# Patient Record
Sex: FEMALE | Race: WHITE | HISPANIC OR LATINO | Employment: FULL TIME
[De-identification: names, ages, dates, MRNs, and addresses within clinical notes are randomized per-mention and may not be internally consistent; named-entity substitution may affect disease eponyms.]

---

## 2018-10-04 ENCOUNTER — HOSPITAL (OUTPATIENT)
Dept: OTHER | Age: 33
End: 2018-10-04
Attending: ORTHOPAEDIC SURGERY

## 2018-11-01 ENCOUNTER — HOSPITAL (OUTPATIENT)
Dept: OTHER | Age: 33
End: 2018-11-01
Attending: ORTHOPAEDIC SURGERY

## 2019-07-17 ENCOUNTER — OFFICE VISIT (OUTPATIENT)
Dept: OBGYN CLINIC | Facility: CLINIC | Age: 34
End: 2019-07-17
Payer: COMMERCIAL

## 2019-07-17 VITALS
SYSTOLIC BLOOD PRESSURE: 109 MMHG | HEART RATE: 74 BPM | DIASTOLIC BLOOD PRESSURE: 75 MMHG | BODY MASS INDEX: 36 KG/M2 | WEIGHT: 191 LBS

## 2019-07-17 DIAGNOSIS — N97.9 INFERTILITY, FEMALE: ICD-10-CM

## 2019-07-17 DIAGNOSIS — Z11.3 ROUTINE SCREENING FOR STI (SEXUALLY TRANSMITTED INFECTION): ICD-10-CM

## 2019-07-17 DIAGNOSIS — Z01.419 ENCOUNTER FOR WELL WOMAN EXAM WITH ROUTINE GYNECOLOGICAL EXAM: Primary | ICD-10-CM

## 2019-07-17 LAB
CYTOLOGY CVX/VAG DOC THIN PREP: NORMAL
HPV16+18+45 E6+E7MRNA CVX NAA+PROBE: NEGATIVE

## 2019-07-17 PROCEDURE — 99385 PREV VISIT NEW AGE 18-39: CPT | Performed by: OBSTETRICS & GYNECOLOGY

## 2019-07-18 LAB
C TRACH DNA SPEC QL NAA+PROBE: NEGATIVE
HPV I/H RISK 1 DNA SPEC QL NAA+PROBE: NEGATIVE
N GONORRHOEA DNA SPEC QL NAA+PROBE: NEGATIVE

## 2019-07-20 NOTE — PROGRESS NOTES
Annual Gyn Exam    HPI Nabil Valentine is a known patient of mine who I had seen a number of years ago at [facility] and is here for an annual gynecologic evaluation and Pap smear.   She is currently seeing reproductive endocrinology for infertility and t shortness of breath and wheezing. Cardiovascular: Negative for palpitations. Gastrointestinal: Negative for abdominal pain, blood in stool, constipation, diarrhea, nausea and vomiting.    Genitourinary: Negative for dyspareunia, dysuria, frequency, hem gynecologic evaluation  Exam, pap with HPV and cervical gonorrhea and Chlamydia cultures done  Discussed flu vaccine recommendations. - THINPREP PAP SMEAR B; Future  - HPV HIGH RISK , THIN PREP COLLECTION;  Future  - THINPREP PAP SMEAR B  - HPV HIGH RISK

## 2019-07-26 ENCOUNTER — TELEPHONE (OUTPATIENT)
Dept: OBGYN CLINIC | Facility: CLINIC | Age: 34
End: 2019-07-26

## 2019-08-20 NOTE — TELEPHONE ENCOUNTER
Labs and pap sent to provider per pt request.   Confirmation page received. Lm informing pt that labs were faxed and pt to rc if has further questions.

## 2020-01-20 ENCOUNTER — TELEPHONE (OUTPATIENT)
Dept: OBGYN CLINIC | Facility: CLINIC | Age: 35
End: 2020-01-20

## 2020-01-20 ENCOUNTER — OFFICE VISIT (OUTPATIENT)
Dept: OBGYN CLINIC | Facility: CLINIC | Age: 35
End: 2020-01-20
Payer: COMMERCIAL

## 2020-01-20 VITALS
BODY MASS INDEX: 36 KG/M2 | SYSTOLIC BLOOD PRESSURE: 121 MMHG | WEIGHT: 189.19 LBS | DIASTOLIC BLOOD PRESSURE: 83 MMHG | HEART RATE: 73 BPM

## 2020-01-20 DIAGNOSIS — N83.201 CYST OF OVARY, RIGHT: Primary | ICD-10-CM

## 2020-01-20 DIAGNOSIS — N83.201 CYST OF RIGHT OVARY: Primary | ICD-10-CM

## 2020-01-20 PROCEDURE — 99213 OFFICE O/P EST LOW 20 MIN: CPT | Performed by: OBSTETRICS & GYNECOLOGY

## 2020-01-20 NOTE — PROGRESS NOTES
[facility], [facility]  Obstetrics and Gynecology  Focused Gynecology Problem Exam  Derek Chan MD    Shamika Villalpando is a 29year old female presenting for Consult (er pt has 2 endemetrionas on right ovary )  .     HPI:   Patient presents with:  Consult: er Transportation needs:        Medical: Not on file        Non-medical: Not on file    Tobacco Use      Smoking status: Never Smoker      Smokeless tobacco: Never Used    Substance and Sexual Activity      Alcohol use: Never        Frequency: Never      Elvin No prescriptions requested or ordered in this encounter     IMAGING/ REFERRALS:    None     Kristal Leonard MD  1/20/2020  1:52 PM

## 2020-01-20 NOTE — TELEPHONE ENCOUNTER
Please schedule the following surgery:    Procedure: daVinci assisted right ovarian cystectomy, possible laparotomy  Assist: (Y/N or none) yes  Date: ASAP  Dx: (N83.201) Cyst of right ovary  (primary encounter diagnosis)  Pre-op appt: (Y/N or n/a) Yes  Adm

## 2020-01-22 NOTE — TELEPHONE ENCOUNTER
Pt scheduled 2/6 in the Xi room at 7:30. Informed of sx details . she Understood and verbalized agreement. Pre-op: 1/29 at [email or web address] (pt informed of location). Pt inquires on recovery time to inform her employer. She also asked about repeating an U/S?  Please

## 2020-01-22 NOTE — TELEPHONE ENCOUNTER
Please provide patient with the number for central scheduling. Her pelvic US order is in the computer. She needs a pre-op visit and should have the pelvic US before her visit.

## 2020-01-23 NOTE — TELEPHONE ENCOUNTER
Per pt scheduled u/s on 29th, no sooner apts available.  Changed pre op apt with TIFF on 1/31 per pt request.

## 2020-01-23 NOTE — TELEPHONE ENCOUNTER
Pt informed of u/s order in system now. provided her with number to central scheduling. She Understood and verbalized agreement.

## 2020-01-28 RX ORDER — CHOLECALCIFEROL (VITAMIN D3) 50 MCG
TABLET ORAL WEEKLY
COMMUNITY
End: 2020-08-18

## 2020-01-28 RX ORDER — NORGESTIMATE AND ETHINYL ESTRADIOL 0.25-0.035
1 KIT ORAL DAILY
COMMUNITY
End: 2020-08-18

## 2020-01-29 ENCOUNTER — HOSPITAL ENCOUNTER (OUTPATIENT)
Dept: ULTRASOUND IMAGING | Age: 35
Discharge: HOME | End: 2020-01-29
Attending: OBSTETRICS & GYNECOLOGY
Payer: COMMERCIAL

## 2020-01-29 DIAGNOSIS — N83.201 CYST OF OVARY, RIGHT: ICD-10-CM

## 2020-01-29 PROCEDURE — 76830 TRANSVAGINAL US NON-OB: CPT | Performed by: OBSTETRICS & GYNECOLOGY

## 2020-01-29 PROCEDURE — 76856 US EXAM PELVIC COMPLETE: CPT | Performed by: OBSTETRICS & GYNECOLOGY

## 2020-01-31 ENCOUNTER — TELEPHONE (OUTPATIENT)
Dept: OBGYN CLINIC | Facility: CLINIC | Age: 35
End: 2020-01-31

## 2020-01-31 ENCOUNTER — OFFICE VISIT (OUTPATIENT)
Dept: OBGYN CLINIC | Facility: CLINIC | Age: 35
End: 2020-01-31
Payer: COMMERCIAL

## 2020-01-31 ENCOUNTER — TELEPHONE (OUTPATIENT)
Dept: INTERNAL MEDICINE CLINIC | Facility: CLINIC | Age: 35
End: 2020-01-31

## 2020-01-31 VITALS — WEIGHT: 190 LBS | SYSTOLIC BLOOD PRESSURE: 122 MMHG | BODY MASS INDEX: 36 KG/M2 | DIASTOLIC BLOOD PRESSURE: 70 MMHG

## 2020-01-31 DIAGNOSIS — Z01.818 PRE-OP TESTING: ICD-10-CM

## 2020-01-31 DIAGNOSIS — N97.9 INFERTILITY, FEMALE: ICD-10-CM

## 2020-01-31 DIAGNOSIS — N83.201 CYST OF RIGHT OVARY: Primary | ICD-10-CM

## 2020-01-31 DIAGNOSIS — R10.2 PELVIC PAIN: ICD-10-CM

## 2020-01-31 PROCEDURE — 99213 OFFICE O/P EST LOW 20 MIN: CPT | Performed by: OBSTETRICS & GYNECOLOGY

## 2020-01-31 NOTE — TELEPHONE ENCOUNTER
Signed HIPAA/FCR - [facility] disab form logged for processing.   in [employer] [facility] when completed

## 2020-01-31 NOTE — TELEPHONE ENCOUNTER
Pt filled out NICOLE, Forms Completion Request form, and left FMLA forms at [facility] location to be filled out by Dr. Chanelle Field. Email sent to forms dept, [employer] interofficed to [facility]. Fee will be paid upon pickup at [facility].

## 2020-02-02 NOTE — PROGRESS NOTES
[facility]  Obstetrics and Gynecology  Focused Gynecology Problem Exam  Elio Duncan MD    Gabriela Julien is a 29year old female presenting for Gyn Exam (pre op)  .     HPI:   Patient presents with:  Gyn Exam: pre op    Pt with persistent right Cancer Other        Social History    Socioeconomic History      Marital status:       Spouse name: Not on file      Number of children: Not on file      Years of education: Not on file      Highest education level: Not on file    Occupational Histo hemorrhagic cyst.  Additional smaller slightly complicated right ovarian cyst measuring 2.1 x 1.1 x 2.0 cm   with fine internal echoes. These both may be hemorrhagic cysts. LEFT:    Left ovary measures 1.7 x 2.0 x 1.8 cm. Small follicles.      CUL-DE-SAC well as cystectomy. Discussed risks of procedure including infection, bleeding, transfusion and damage to other organs including bowel, bladder, blood vessels, ovary etc.  Discussed possible laparotomy. Pt understands and has consented to the procedure.

## 2020-02-03 ENCOUNTER — LAB ENCOUNTER (OUTPATIENT)
Dept: LAB | Age: 35
End: 2020-02-03
Attending: OBSTETRICS & GYNECOLOGY
Payer: COMMERCIAL

## 2020-02-03 DIAGNOSIS — Z01.818 PRE-OP TESTING: ICD-10-CM

## 2020-02-03 LAB
ANTIBODY SCREEN: NEGATIVE
BASOPHILS # BLD AUTO: 0.05 X10(3) UL (ref 0–0.2)
BASOPHILS NFR BLD AUTO: 0.5 %
DEPRECATED RDW RBC AUTO: 42.2 FL (ref 35.1–46.3)
EOSINOPHIL # BLD AUTO: 0.51 X10(3) UL (ref 0–0.7)
EOSINOPHIL NFR BLD AUTO: 5.4 %
ERYTHROCYTE [DISTWIDTH] IN BLOOD BY AUTOMATED COUNT: 12.9 % (ref 11–15)
HCT VFR BLD AUTO: 37.3 % (ref 35–48)
HGB BLD-MCNC: 12 G/DL (ref 12–16)
IMM GRANULOCYTES # BLD AUTO: 0.02 X10(3) UL (ref 0–1)
IMM GRANULOCYTES NFR BLD: 0.2 %
LYMPHOCYTES # BLD AUTO: 1.78 X10(3) UL (ref 1–4)
LYMPHOCYTES NFR BLD AUTO: 18.8 %
MCH RBC QN AUTO: 29 PG (ref 26–34)
MCHC RBC AUTO-ENTMCNC: 32.2 G/DL (ref 31–37)
MCV RBC AUTO: 90.1 FL (ref 80–100)
MONOCYTES # BLD AUTO: 0.31 X10(3) UL (ref 0.1–1)
MONOCYTES NFR BLD AUTO: 3.3 %
NEUTROPHILS # BLD AUTO: 6.81 X10 (3) UL (ref 1.5–7.7)
NEUTROPHILS # BLD AUTO: 6.81 X10(3) UL (ref 1.5–7.7)
NEUTROPHILS NFR BLD AUTO: 71.8 %
PLATELET # BLD AUTO: 204 10(3)UL (ref 150–450)
RBC # BLD AUTO: 4.14 X10(6)UL (ref 3.8–5.3)
RH BLOOD TYPE: POSITIVE
WBC # BLD AUTO: 9.5 X10(3) UL (ref 4–11)

## 2020-02-03 PROCEDURE — 36415 COLL VENOUS BLD VENIPUNCTURE: CPT

## 2020-02-03 PROCEDURE — 86900 BLOOD TYPING SEROLOGIC ABO: CPT

## 2020-02-03 PROCEDURE — 86901 BLOOD TYPING SEROLOGIC RH(D): CPT

## 2020-02-03 PROCEDURE — 85025 COMPLETE CBC W/AUTO DIFF WBC: CPT

## 2020-02-03 PROCEDURE — 86850 RBC ANTIBODY SCREEN: CPT

## 2020-02-04 ENCOUNTER — TELEPHONE (OUTPATIENT)
Dept: OBGYN CLINIC | Facility: CLINIC | Age: 35
End: 2020-02-04

## 2020-02-04 NOTE — TELEPHONE ENCOUNTER
Pt requesting note that she is having surgery on Thursday and that she will be off for 2 weeks. Please advise.

## 2020-02-04 NOTE — TELEPHONE ENCOUNTER
Please provide patient with a note stating that she is having surgery 2/6/2020 and will be off work for 2 weeks.

## 2020-02-06 ENCOUNTER — HOSPITAL ENCOUNTER (OUTPATIENT)
Facility: HOSPITAL | Age: 35
Setting detail: HOSPITAL OUTPATIENT SURGERY
Discharge: HOME | End: 2020-02-06
Attending: OBSTETRICS & GYNECOLOGY | Admitting: OBSTETRICS & GYNECOLOGY
Payer: COMMERCIAL

## 2020-02-06 ENCOUNTER — ANESTHESIA EVENT (OUTPATIENT)
Dept: SURGERY | Facility: HOSPITAL | Age: 35
End: 2020-02-06
Payer: COMMERCIAL

## 2020-02-06 ENCOUNTER — ANESTHESIA (OUTPATIENT)
Dept: SURGERY | Facility: HOSPITAL | Age: 35
End: 2020-02-06
Payer: COMMERCIAL

## 2020-02-06 VITALS
BODY MASS INDEX: 37.11 KG/M2 | WEIGHT: 189 LBS | RESPIRATION RATE: 18 BRPM | SYSTOLIC BLOOD PRESSURE: 126 MMHG | TEMPERATURE: 98 F | HEIGHT: 60 IN | OXYGEN SATURATION: 98 % | DIASTOLIC BLOOD PRESSURE: 66 MMHG | HEART RATE: 91 BPM

## 2020-02-06 DIAGNOSIS — N83.201 CYST OF OVARY, RIGHT: ICD-10-CM

## 2020-02-06 LAB — B-HCG UR QL: NEGATIVE

## 2020-02-06 PROCEDURE — 58662 LAPAROSCOPY EXCISE LESIONS: CPT | Performed by: OBSTETRICS & GYNECOLOGY

## 2020-02-06 PROCEDURE — 8E0W4CZ ROBOTIC ASSISTED PROCEDURE OF TRUNK REGION, PERCUTANEOUS ENDOSCOPIC APPROACH: ICD-10-PCS | Performed by: OBSTETRICS & GYNECOLOGY

## 2020-02-06 PROCEDURE — 0UB04ZZ EXCISION OF RIGHT OVARY, PERCUTANEOUS ENDOSCOPIC APPROACH: ICD-10-PCS | Performed by: OBSTETRICS & GYNECOLOGY

## 2020-02-06 RX ORDER — MORPHINE SULFATE 4 MG/ML
4 INJECTION, SOLUTION INTRAMUSCULAR; INTRAVENOUS EVERY 10 MIN PRN
Status: DISCONTINUED | OUTPATIENT
Start: 2020-02-06 | End: 2020-02-06

## 2020-02-06 RX ORDER — HYDROCODONE BITARTRATE AND ACETAMINOPHEN 5; 325 MG/1; MG/1
2 TABLET ORAL AS NEEDED
Status: DISCONTINUED | OUTPATIENT
Start: 2020-02-06 | End: 2020-02-06

## 2020-02-06 RX ORDER — SCOLOPAMINE TRANSDERMAL SYSTEM 1 MG/1
1 PATCH, EXTENDED RELEASE TRANSDERMAL
Status: DISCONTINUED | OUTPATIENT
Start: 2020-02-06 | End: 2020-02-09 | Stop reason: HOSPADM

## 2020-02-06 RX ORDER — HYDROMORPHONE HYDROCHLORIDE 1 MG/ML
0.2 INJECTION, SOLUTION INTRAMUSCULAR; INTRAVENOUS; SUBCUTANEOUS EVERY 5 MIN PRN
Status: DISCONTINUED | OUTPATIENT
Start: 2020-02-06 | End: 2020-02-06

## 2020-02-06 RX ORDER — HALOPERIDOL 5 MG/ML
0.25 INJECTION INTRAMUSCULAR ONCE AS NEEDED
Status: DISCONTINUED | OUTPATIENT
Start: 2020-02-06 | End: 2020-02-06

## 2020-02-06 RX ORDER — NEOSTIGMINE METHYLSULFATE 0.5 MG/ML
INJECTION INTRAVENOUS AS NEEDED
Status: DISCONTINUED | OUTPATIENT
Start: 2020-02-06 | End: 2020-02-06 | Stop reason: SURG

## 2020-02-06 RX ORDER — SODIUM CHLORIDE, SODIUM LACTATE, POTASSIUM CHLORIDE, CALCIUM CHLORIDE 600; 310; 30; 20 MG/100ML; MG/100ML; MG/100ML; MG/100ML
INJECTION, SOLUTION INTRAVENOUS CONTINUOUS
Status: DISCONTINUED | OUTPATIENT
Start: 2020-02-06 | End: 2020-02-06

## 2020-02-06 RX ORDER — KETOROLAC TROMETHAMINE 30 MG/ML
INJECTION, SOLUTION INTRAMUSCULAR; INTRAVENOUS AS NEEDED
Status: DISCONTINUED | OUTPATIENT
Start: 2020-02-06 | End: 2020-02-06 | Stop reason: SURG

## 2020-02-06 RX ORDER — GLYCOPYRROLATE 0.2 MG/ML
INJECTION INTRAMUSCULAR; INTRAVENOUS AS NEEDED
Status: DISCONTINUED | OUTPATIENT
Start: 2020-02-06 | End: 2020-02-06 | Stop reason: SURG

## 2020-02-06 RX ORDER — EPHEDRINE SULFATE 50 MG/ML
INJECTION, SOLUTION INTRAVENOUS AS NEEDED
Status: DISCONTINUED | OUTPATIENT
Start: 2020-02-06 | End: 2020-02-06 | Stop reason: SURG

## 2020-02-06 RX ORDER — PROCHLORPERAZINE EDISYLATE 5 MG/ML
5 INJECTION INTRAMUSCULAR; INTRAVENOUS ONCE AS NEEDED
Status: COMPLETED | OUTPATIENT
Start: 2020-02-06 | End: 2020-02-06

## 2020-02-06 RX ORDER — ONDANSETRON 2 MG/ML
INJECTION INTRAMUSCULAR; INTRAVENOUS AS NEEDED
Status: DISCONTINUED | OUTPATIENT
Start: 2020-02-06 | End: 2020-02-06 | Stop reason: SURG

## 2020-02-06 RX ORDER — MIDAZOLAM HYDROCHLORIDE 1 MG/ML
INJECTION INTRAMUSCULAR; INTRAVENOUS AS NEEDED
Status: DISCONTINUED | OUTPATIENT
Start: 2020-02-06 | End: 2020-02-06 | Stop reason: SURG

## 2020-02-06 RX ORDER — DEXAMETHASONE SODIUM PHOSPHATE 4 MG/ML
VIAL (ML) INJECTION AS NEEDED
Status: DISCONTINUED | OUTPATIENT
Start: 2020-02-06 | End: 2020-02-06 | Stop reason: SURG

## 2020-02-06 RX ORDER — METOCLOPRAMIDE 10 MG/1
10 TABLET ORAL ONCE
Status: COMPLETED | OUTPATIENT
Start: 2020-02-06 | End: 2020-02-06

## 2020-02-06 RX ORDER — MORPHINE SULFATE 10 MG/ML
6 INJECTION, SOLUTION INTRAMUSCULAR; INTRAVENOUS EVERY 10 MIN PRN
Status: DISCONTINUED | OUTPATIENT
Start: 2020-02-06 | End: 2020-02-06

## 2020-02-06 RX ORDER — HYDROMORPHONE HYDROCHLORIDE 1 MG/ML
0.4 INJECTION, SOLUTION INTRAMUSCULAR; INTRAVENOUS; SUBCUTANEOUS EVERY 5 MIN PRN
Status: DISCONTINUED | OUTPATIENT
Start: 2020-02-06 | End: 2020-02-06

## 2020-02-06 RX ORDER — NALOXONE HYDROCHLORIDE 0.4 MG/ML
80 INJECTION, SOLUTION INTRAMUSCULAR; INTRAVENOUS; SUBCUTANEOUS AS NEEDED
Status: DISCONTINUED | OUTPATIENT
Start: 2020-02-06 | End: 2020-02-06

## 2020-02-06 RX ORDER — ONDANSETRON 2 MG/ML
4 INJECTION INTRAMUSCULAR; INTRAVENOUS ONCE AS NEEDED
Status: COMPLETED | OUTPATIENT
Start: 2020-02-06 | End: 2020-02-06

## 2020-02-06 RX ORDER — HYDROCODONE BITARTRATE AND ACETAMINOPHEN 5; 325 MG/1; MG/1
1 TABLET ORAL EVERY 6 HOURS PRN
Qty: 10 TABLET | Refills: 0 | Status: SHIPPED | OUTPATIENT
Start: 2020-02-06 | End: 2020-08-18

## 2020-02-06 RX ORDER — BUPIVACAINE HYDROCHLORIDE 2.5 MG/ML
INJECTION, SOLUTION EPIDURAL; INFILTRATION; INTRACAUDAL AS NEEDED
Status: DISCONTINUED | OUTPATIENT
Start: 2020-02-06 | End: 2020-02-06 | Stop reason: HOSPADM

## 2020-02-06 RX ORDER — FAMOTIDINE 20 MG/1
20 TABLET ORAL ONCE
Status: COMPLETED | OUTPATIENT
Start: 2020-02-06 | End: 2020-02-06

## 2020-02-06 RX ORDER — MORPHINE SULFATE 4 MG/ML
2 INJECTION, SOLUTION INTRAMUSCULAR; INTRAVENOUS EVERY 10 MIN PRN
Status: DISCONTINUED | OUTPATIENT
Start: 2020-02-06 | End: 2020-02-06

## 2020-02-06 RX ORDER — HYDROMORPHONE HYDROCHLORIDE 1 MG/ML
0.6 INJECTION, SOLUTION INTRAMUSCULAR; INTRAVENOUS; SUBCUTANEOUS EVERY 5 MIN PRN
Status: DISCONTINUED | OUTPATIENT
Start: 2020-02-06 | End: 2020-02-06

## 2020-02-06 RX ORDER — IBUPROFEN 600 MG/1
600 TABLET ORAL EVERY 6 HOURS PRN
Qty: 30 TABLET | Refills: 0 | Status: SHIPPED | OUTPATIENT
Start: 2020-02-06 | End: 2020-08-18

## 2020-02-06 RX ORDER — HYDROCODONE BITARTRATE AND ACETAMINOPHEN 5; 325 MG/1; MG/1
1 TABLET ORAL AS NEEDED
Status: DISCONTINUED | OUTPATIENT
Start: 2020-02-06 | End: 2020-02-06

## 2020-02-06 RX ORDER — GLYCOPYRROLATE 0.2 MG/ML
INJECTION INTRAMUSCULAR; INTRAVENOUS AS NEEDED
Status: DISCONTINUED | OUTPATIENT
Start: 2020-02-06 | End: 2020-02-06

## 2020-02-06 RX ORDER — ROCURONIUM BROMIDE 10 MG/ML
INJECTION, SOLUTION INTRAVENOUS AS NEEDED
Status: DISCONTINUED | OUTPATIENT
Start: 2020-02-06 | End: 2020-02-06 | Stop reason: SURG

## 2020-02-06 RX ORDER — ACETAMINOPHEN 500 MG
1000 TABLET ORAL ONCE
Status: COMPLETED | OUTPATIENT
Start: 2020-02-06 | End: 2020-02-06

## 2020-02-06 RX ORDER — LIDOCAINE HYDROCHLORIDE 10 MG/ML
INJECTION, SOLUTION EPIDURAL; INFILTRATION; INTRACAUDAL; PERINEURAL AS NEEDED
Status: DISCONTINUED | OUTPATIENT
Start: 2020-02-06 | End: 2020-02-06 | Stop reason: SURG

## 2020-02-06 RX ADMIN — GLYCOPYRROLATE 0.6 MG: 0.2 INJECTION INTRAMUSCULAR; INTRAVENOUS at 08:57:00

## 2020-02-06 RX ADMIN — EPHEDRINE SULFATE 10 MG: 50 INJECTION, SOLUTION INTRAVENOUS at 08:11:00

## 2020-02-06 RX ADMIN — MIDAZOLAM HYDROCHLORIDE 2 MG: 1 INJECTION INTRAMUSCULAR; INTRAVENOUS at 07:30:00

## 2020-02-06 RX ADMIN — ONDANSETRON 4 MG: 2 INJECTION INTRAMUSCULAR; INTRAVENOUS at 07:40:00

## 2020-02-06 RX ADMIN — SODIUM CHLORIDE, SODIUM LACTATE, POTASSIUM CHLORIDE, CALCIUM CHLORIDE: 600; 310; 30; 20 INJECTION, SOLUTION INTRAVENOUS at 08:11:00

## 2020-02-06 RX ADMIN — LIDOCAINE HYDROCHLORIDE 50 MG: 10 INJECTION, SOLUTION EPIDURAL; INFILTRATION; INTRACAUDAL; PERINEURAL at 07:40:00

## 2020-02-06 RX ADMIN — NEOSTIGMINE METHYLSULFATE 5 MG: 0.5 INJECTION INTRAVENOUS at 08:56:00

## 2020-02-06 RX ADMIN — ROCURONIUM BROMIDE 10 MG: 10 INJECTION, SOLUTION INTRAVENOUS at 08:04:00

## 2020-02-06 RX ADMIN — KETOROLAC TROMETHAMINE 30 MG: 30 INJECTION, SOLUTION INTRAMUSCULAR; INTRAVENOUS at 08:57:00

## 2020-02-06 RX ADMIN — ROCURONIUM BROMIDE 50 MG: 10 INJECTION, SOLUTION INTRAVENOUS at 07:40:00

## 2020-02-06 RX ADMIN — DEXAMETHASONE SODIUM PHOSPHATE 4 MG: 4 MG/ML VIAL (ML) INJECTION at 07:40:00

## 2020-02-06 NOTE — OPERATIVE REPORT
[facility]    PATIENT'S NAME: Maria Guadalupe Kaufman   ATTENDING PHYSICIAN: Eric Saunders MD   OPERATING PHYSICIAN: Eric Saunders MD   PATIENT ACCOUNT#:   [id number]    LOCATION:  [facility] [address] PACU [address] [place] [address]  MEDICAL RECORD #:   [id number] XI port was placed approximately 3 cm above the umbilicus. Marcaine 0.25% was used, scalpel was used to make a skin incision and trocar was placed intraperitoneally without complications.    The patient was placed in steep Trendelenburg and the [employer] [facility] ro released as the trocars were removed. The trocars were removed after the [employer] robot was undocked. The skin incisions were then closed with 4-0 Vicryl in a subcuticular fashion. The uterine manipulator and the Ansari catheter were removed.   The kahlil

## 2020-02-06 NOTE — BRIEF OP NOTE
[facility] POST ANESTHESIA CARE UNIT  Operative Note     Priti Mercado Location: OR   [facility] [id number] MRN [id number]   Admission Date 2/6/2020 Operation Date 2/6/2020   Attending Physician Brittany Griffiths MD Operating Physician Brendan Lind

## 2020-02-06 NOTE — ANESTHESIA PROCEDURE NOTES
Airway  Urgency: Elective      General Information and Staff    Patient location during procedure: OR  Anesthesiologist: Sarah Pisano MD  Resident/CRNA: Sigrid Nuñez CRNA  Performed: CRNA     Indications and Patient Condition  Indications for airway

## 2020-02-06 NOTE — H&P
[facility], [facility]  Obstetrics and Gynecology  Focused Gynecology Problem Exam  Saulo Garvey MD     Huang Carrera is a 29year old female presenting for Gyn Exam (pre op)  .     HPI:   Patient presents with:  Gyn Exam: pre op     Pt with persistent rig Disorder Mother     • Breast Cancer Other     • Ovarian Cancer Other     • Colon Cancer Other             Social History       Socioeconomic History      Marital status:       Spouse name: Not on file      Number of children: Not on file      Years in the right ovary measuring 3.8 x 2.9 x 3.4 cm with fine internal echoes may be a hemorrhagic cyst.  Additional smaller slightly complicated right ovarian cyst measuring 2.1 x 1.1 x 2.0 cm   with fine internal echoes.  These both may be hemorrhagic cysts. including benign endometrioma. Pt counseled on opton of laparoscopy for diagnosis as well as cystectomy.   Discussed risks of procedure including infection, bleeding, transfusion and damage to other organs including bowel, bladder, blood vessels, ovary etc

## 2020-02-06 NOTE — ANESTHESIA POSTPROCEDURE EVALUATION
Patient: Violeta Quan    Procedure Summary     Date:  02/06/20 Room / Location:  [address] [place] [address] MAIN OR 07 / [address] [place] [address] MAIN OR    Anesthesia Start:  0730 Anesthesia Stop:      Procedure:  XI ROBOT-ASSISTED LAPAROSCOPIC OVARIAN CYSTECTOMY/ SALPINGO-OOPHORECTOMY (N/A )

## 2020-02-06 NOTE — ANESTHESIA PREPROCEDURE EVALUATION
Anesthesia PreOp Note    HPI:     Marie Ramos is a 29year old female who presents for preoperative consultation requested by: Jamal Samuel MD    Date of Surgery: 2/6/2020    Procedure(s):  XI ROBOT-ASSISTED LAPAROSCOPIC OVARIAN CYSTECTOMY/ ALBERTO Occupational History      Not on file    Social Needs      Financial resource strain: Not on file      Food insecurity:        Worry: Not on file        Inability: Not on file      Transportation needs:        Medical: Not on file        Non-medical: Not o 86.2 kg (190 lb) 85.7 kg (189 lb)   Height: 1.549 m (5' 1\") 1.524 m (5')        Anesthesia Evaluation     Patient summary reviewed and Nursing notes reviewed    History of anesthetic complications   Airway   Mallampati: III  TM distance: >3 FB  Neck ROM:

## 2020-02-07 NOTE — TELEPHONE ENCOUNTER
Dr. Jennifer Yoo,    Please sign off on form:  -Highlight the patient and hit \"Chart\" button. -In Chart Review, w/in the Encounter tab - click 1 time on the Telephone call encounter for 1/31/20.  Scroll down the telephone encounter.  -Click \"scan on\" blue Hy

## 2020-02-10 ENCOUNTER — TELEPHONE (OUTPATIENT)
Dept: OBGYN CLINIC | Facility: CLINIC | Age: 35
End: 2020-02-10

## 2020-02-10 NOTE — TELEPHONE ENCOUNTER
Received disability forms for patient by fax from [address] [facility]. Faxing forms to Forms dept due to email/scanning not working.

## 2020-02-10 NOTE — TELEPHONE ENCOUNTER
Faxed disab to [address] - [phone].  Sent [employer] to  & pay $25. Please print for pt from the Media tab -> uncheck clinical info only box -> Disab Dr. Bales Laughter 2/7/20

## 2020-02-22 ENCOUNTER — OFFICE VISIT (OUTPATIENT)
Dept: OBGYN CLINIC | Facility: CLINIC | Age: 35
End: 2020-02-22
Payer: COMMERCIAL

## 2020-02-22 VITALS — DIASTOLIC BLOOD PRESSURE: 72 MMHG | SYSTOLIC BLOOD PRESSURE: 110 MMHG

## 2020-02-22 DIAGNOSIS — Z98.890 POST-OPERATIVE STATE: Primary | ICD-10-CM

## 2020-02-22 DIAGNOSIS — D27.0 MUCINOUS CYSTADENOMA OF OVARY, RIGHT: ICD-10-CM

## 2020-02-22 PROCEDURE — 99024 POSTOP FOLLOW-UP VISIT: CPT | Performed by: OBSTETRICS & GYNECOLOGY

## 2020-02-23 NOTE — PROGRESS NOTES
[facility], [facility]  Obstetrics and Gynecology  Focused Gynecology Problem Exam  MD Luc Carrascoadolfo Swain is a 29year old female presenting for Gyn Exam (2wk post op)  .     HPI:   Patient presents with:  Gyn Exam: 2wk post op    Pt is s/p Camila Laterality Date   •      • OTHER SURGICAL HISTORY      left salpingectomy   • XI ROBOT-ASSISTED LAPAROSCOPIC OVARIAN CYSTECTOMY/ SALPINGO-OOPHORECTOMY N/A 2020    Performed by Belinda Kirk MD at [facility] MAIN OR       Family History EXAM:   /72   LMP 2020   Breastfeeding No     GENERAL: well developed, well nourished, in no apparent distress  ABDOMEN: Soft, non distended; non tender, no masses; wounds are clean/dry/intact and healing well     ASSESSMENT:    A: 29 y.o.

## 2020-08-06 ENCOUNTER — EMPLOYEE HEALTH (OUTPATIENT)
Dept: OTHER | Age: 35
End: 2020-08-06

## 2020-08-07 ENCOUNTER — EMPLOYEE HEALTH (OUTPATIENT)
Dept: OTHER | Age: 35
End: 2020-08-07

## 2020-08-18 ENCOUNTER — TELEPHONE (OUTPATIENT)
Dept: OBGYN CLINIC | Facility: CLINIC | Age: 35
End: 2020-08-18

## 2020-08-18 ENCOUNTER — OFFICE VISIT (OUTPATIENT)
Dept: OBGYN CLINIC | Facility: CLINIC | Age: 35
End: 2020-08-18
Payer: COMMERCIAL

## 2020-08-18 VITALS
DIASTOLIC BLOOD PRESSURE: 82 MMHG | HEART RATE: 67 BPM | WEIGHT: 195 LBS | SYSTOLIC BLOOD PRESSURE: 117 MMHG | BODY MASS INDEX: 38 KG/M2

## 2020-08-18 DIAGNOSIS — Z01.419 WOMEN'S ANNUAL ROUTINE GYNECOLOGICAL EXAMINATION: Primary | ICD-10-CM

## 2020-08-18 DIAGNOSIS — N97.9 INFERTILITY, FEMALE: ICD-10-CM

## 2020-08-18 PROCEDURE — 99395 PREV VISIT EST AGE 18-39: CPT | Performed by: OBSTETRICS & GYNECOLOGY

## 2020-08-18 PROCEDURE — 3079F DIAST BP 80-89 MM HG: CPT | Performed by: OBSTETRICS & GYNECOLOGY

## 2020-08-18 PROCEDURE — 3074F SYST BP LT 130 MM HG: CPT | Performed by: OBSTETRICS & GYNECOLOGY

## 2020-08-18 RX ORDER — MELATONIN
1000 DAILY
COMMUNITY

## 2020-08-18 NOTE — PROGRESS NOTES
Annual Gyn Exam    HPI  Junie Barkley is a known patient of mine and is here for an annual gynecologic evaluation. She is being seen by [facility], Dr. Lorrie Lara, for treatment of secondary infertility.   She is due for an annual physical which wa is currently sexually active no change in partner    Current Outpatient Medications:   •  Vitamin D3 25 MCG (1000 UT) Oral Tab, Take 1,000 Units by mouth daily. , Disp: , Rfl:   •  Prenatal Multivit-Min-Fe-FA (PRE- FORMULA OR), Take by mouth daily. Hesham Jack alert and oriented to person, place, and time. Skin: No rash noted. No erythema. Psychiatric: She has a normal mood and affect.  Her behavior is normal.     /82   Pulse 67   Wt 195 lb (88.5 kg)   LMP 02/20/2020   BMI 38.08 kg/m²     Assessment and

## 2020-08-19 LAB
C TRACH DNA SPEC QL NAA+PROBE: NEGATIVE
N GONORRHOEA DNA SPEC QL NAA+PROBE: NEGATIVE

## 2020-09-30 ENCOUNTER — HOSPITAL (OUTPATIENT)
Dept: OTHER | Age: 35
End: 2020-09-30
Attending: INTERNAL MEDICINE

## 2021-03-22 ENCOUNTER — IMMUNIZATION (OUTPATIENT)
Dept: LAB | Age: 36
End: 2021-03-22

## 2021-03-22 DIAGNOSIS — Z23 NEED FOR VACCINATION: Primary | ICD-10-CM

## 2021-03-22 PROCEDURE — 0001A COVID 19 PFIZER-BIONTECH: CPT | Performed by: HOSPITALIST

## 2021-03-22 PROCEDURE — 91300 COVID 19 PFIZER-BIONTECH: CPT | Performed by: HOSPITALIST

## 2021-04-19 ENCOUNTER — IMMUNIZATION (OUTPATIENT)
Dept: LAB | Age: 36
End: 2021-04-19

## 2021-04-19 DIAGNOSIS — Z23 NEED FOR VACCINATION: Primary | ICD-10-CM

## 2021-04-19 PROCEDURE — 0002A COVID 19 PFIZER-BIONTECH: CPT

## 2021-04-19 PROCEDURE — 91300 COVID 19 PFIZER-BIONTECH: CPT

## 2021-12-04 ENCOUNTER — OFFICE VISIT (OUTPATIENT)
Dept: OBGYN CLINIC | Facility: CLINIC | Age: 36
End: 2021-12-04
Payer: COMMERCIAL

## 2021-12-04 VITALS
DIASTOLIC BLOOD PRESSURE: 76 MMHG | HEIGHT: 60 IN | SYSTOLIC BLOOD PRESSURE: 118 MMHG | WEIGHT: 192.63 LBS | BODY MASS INDEX: 37.82 KG/M2

## 2021-12-04 DIAGNOSIS — B37.3 VULVOVAGINITIS DUE TO YEAST: Primary | ICD-10-CM

## 2021-12-04 DIAGNOSIS — N97.9 INFERTILITY, FEMALE: ICD-10-CM

## 2021-12-04 PROCEDURE — 3008F BODY MASS INDEX DOCD: CPT | Performed by: OBSTETRICS & GYNECOLOGY

## 2021-12-04 PROCEDURE — 99214 OFFICE O/P EST MOD 30 MIN: CPT | Performed by: OBSTETRICS & GYNECOLOGY

## 2021-12-04 PROCEDURE — 3074F SYST BP LT 130 MM HG: CPT | Performed by: OBSTETRICS & GYNECOLOGY

## 2021-12-04 PROCEDURE — 3078F DIAST BP <80 MM HG: CPT | Performed by: OBSTETRICS & GYNECOLOGY

## 2021-12-04 RX ORDER — FLUCONAZOLE 150 MG/1
150 TABLET ORAL ONCE
Qty: 1 TABLET | Refills: 0 | Status: SHIPPED | OUTPATIENT
Start: 2021-12-04 | End: 2021-12-04

## 2021-12-29 ENCOUNTER — EMPLOYEE HEALTH (OUTPATIENT)
Dept: OTHER | Age: 36
End: 2021-12-29

## 2021-12-29 DIAGNOSIS — Z20.822 SUSPECTED COVID-19 VIRUS INFECTION: Primary | ICD-10-CM

## 2021-12-31 ENCOUNTER — EMPLOYEE HEALTH (OUTPATIENT)
Dept: OTHER | Age: 36
End: 2021-12-31

## 2022-01-03 ENCOUNTER — EMPLOYEE HEALTH (OUTPATIENT)
Dept: OTHER | Age: 37
End: 2022-01-03

## 2022-01-03 ENCOUNTER — APPOINTMENT (OUTPATIENT)
Dept: FAMILY MEDICINE | Age: 37
End: 2022-01-03

## 2022-03-07 ENCOUNTER — OFFICE VISIT (OUTPATIENT)
Dept: FAMILY MEDICINE | Age: 37
End: 2022-03-07

## 2022-03-07 VITALS
WEIGHT: 187.39 LBS | HEIGHT: 60 IN | HEART RATE: 77 BPM | TEMPERATURE: 97.9 F | OXYGEN SATURATION: 99 % | BODY MASS INDEX: 36.79 KG/M2 | SYSTOLIC BLOOD PRESSURE: 131 MMHG | DIASTOLIC BLOOD PRESSURE: 89 MMHG

## 2022-03-07 DIAGNOSIS — Z00.00 ANNUAL PHYSICAL EXAM: Primary | ICD-10-CM

## 2022-03-07 DIAGNOSIS — L65.9 HAIR LOSS: ICD-10-CM

## 2022-03-07 DIAGNOSIS — R23.2 HOT FLASHES: ICD-10-CM

## 2022-03-07 PROCEDURE — 99385 PREV VISIT NEW AGE 18-39: CPT | Performed by: FAMILY MEDICINE

## 2022-03-07 ASSESSMENT — PATIENT HEALTH QUESTIONNAIRE - PHQ9
SUM OF ALL RESPONSES TO PHQ9 QUESTIONS 1 AND 2: 0
1. LITTLE INTEREST OR PLEASURE IN DOING THINGS: NOT AT ALL
CLINICAL INTERPRETATION OF PHQ2 SCORE: NO FURTHER SCREENING NEEDED
SUM OF ALL RESPONSES TO PHQ9 QUESTIONS 1 AND 2: 0
2. FEELING DOWN, DEPRESSED OR HOPELESS: NOT AT ALL

## 2022-03-08 ENCOUNTER — LAB SERVICES (OUTPATIENT)
Dept: LAB | Age: 37
End: 2022-03-08

## 2022-03-08 ENCOUNTER — OFFICE VISIT (OUTPATIENT)
Dept: OBGYN CLINIC | Facility: CLINIC | Age: 37
End: 2022-03-08
Payer: COMMERCIAL

## 2022-03-08 VITALS — WEIGHT: 187.19 LBS | DIASTOLIC BLOOD PRESSURE: 77 MMHG | SYSTOLIC BLOOD PRESSURE: 115 MMHG | BODY MASS INDEX: 37 KG/M2

## 2022-03-08 DIAGNOSIS — B37.3 VAGINAL YEAST INFECTION: Primary | ICD-10-CM

## 2022-03-08 DIAGNOSIS — R23.2 HOT FLASHES: ICD-10-CM

## 2022-03-08 DIAGNOSIS — N89.8 VAGINAL DISCHARGE: ICD-10-CM

## 2022-03-08 DIAGNOSIS — L65.9 HAIR LOSS: ICD-10-CM

## 2022-03-08 DIAGNOSIS — Z00.00 ANNUAL PHYSICAL EXAM: ICD-10-CM

## 2022-03-08 LAB
ALBUMIN SERPL-MCNC: 3.8 G/DL (ref 3.6–5.1)
ALBUMIN/GLOB SERPL: 1.3 {RATIO} (ref 1–2.4)
ALP SERPL-CCNC: 50 UNITS/L (ref 45–117)
ALT SERPL-CCNC: 19 UNITS/L
ANION GAP SERPL CALC-SCNC: 10 MMOL/L (ref 10–20)
AST SERPL-CCNC: 11 UNITS/L
BASOPHILS # BLD: 0 K/MCL (ref 0–0.3)
BASOPHILS NFR BLD: 1 %
BILIRUB SERPL-MCNC: 0.2 MG/DL (ref 0.2–1)
BUN SERPL-MCNC: 10 MG/DL (ref 6–20)
BUN/CREAT SERPL: 18 (ref 7–25)
CALCIUM SERPL-MCNC: 8.6 MG/DL (ref 8.4–10.2)
CHLORIDE SERPL-SCNC: 107 MMOL/L (ref 98–107)
CHOLEST SERPL-MCNC: 185 MG/DL
CHOLEST/HDLC SERPL: 3.6 {RATIO}
CO2 SERPL-SCNC: 28 MMOL/L (ref 21–32)
CREAT SERPL-MCNC: 0.57 MG/DL (ref 0.51–0.95)
DEPRECATED RDW RBC: 46.2 FL (ref 39–50)
EOSINOPHIL # BLD: 0.2 K/MCL (ref 0–0.5)
EOSINOPHIL NFR BLD: 3 %
ERYTHROCYTE [DISTWIDTH] IN BLOOD: 13.7 % (ref 11–15)
FASTING DURATION TIME PATIENT: 12 HOURS (ref 0–999)
FASTING DURATION TIME PATIENT: 12 HOURS (ref 0–999)
FERRITIN SERPL-MCNC: 6 NG/ML (ref 8–252)
FSH SERPL-ACNC: 16.2 MUNITS/ML
GFR SERPLBLD BASED ON 1.73 SQ M-ARVRAT: >90 ML/MIN
GLOBULIN SER-MCNC: 3 G/DL (ref 2–4)
GLUCOSE SERPL-MCNC: 90 MG/DL (ref 70–99)
HCT VFR BLD CALC: 40.3 % (ref 36–46.5)
HDLC SERPL-MCNC: 51 MG/DL
HGB BLD-MCNC: 12.7 G/DL (ref 12–15.5)
IMM GRANULOCYTES # BLD AUTO: 0 K/MCL (ref 0–0.2)
IMM GRANULOCYTES # BLD: 0 %
IRON SATN MFR SERPL: 10 % (ref 15–45)
IRON SERPL-MCNC: 41 MCG/DL (ref 50–170)
LDLC SERPL CALC-MCNC: 115 MG/DL
LYMPHOCYTES # BLD: 2.1 K/MCL (ref 1–4.8)
LYMPHOCYTES NFR BLD: 30 %
MCH RBC QN AUTO: 28.9 PG (ref 26–34)
MCHC RBC AUTO-ENTMCNC: 31.5 G/DL (ref 32–36.5)
MCV RBC AUTO: 91.6 FL (ref 78–100)
MONOCYTES # BLD: 0.4 K/MCL (ref 0.3–0.9)
MONOCYTES NFR BLD: 6 %
NEUTROPHILS # BLD: 4.2 K/MCL (ref 1.8–7.7)
NEUTROPHILS NFR BLD: 60 %
NONHDLC SERPL-MCNC: 134 MG/DL
NRBC BLD MANUAL-RTO: 0 /100 WBC
PLATELET # BLD AUTO: 186 K/MCL (ref 140–450)
POTASSIUM SERPL-SCNC: 4.3 MMOL/L (ref 3.4–5.1)
PROT SERPL-MCNC: 6.8 G/DL (ref 6.4–8.2)
RBC # BLD: 4.4 MIL/MCL (ref 4–5.2)
SODIUM SERPL-SCNC: 141 MMOL/L (ref 135–145)
TIBC SERPL-MCNC: 398 MCG/DL (ref 250–450)
TRIGL SERPL-MCNC: 95 MG/DL
TSH SERPL-ACNC: 0.86 MCUNITS/ML (ref 0.35–5)
WBC # BLD: 6.9 K/MCL (ref 4.2–11)

## 2022-03-08 PROCEDURE — 83001 ASSAY OF GONADOTROPIN (FSH): CPT | Performed by: PSYCHIATRY & NEUROLOGY

## 2022-03-08 PROCEDURE — 87158 CULTURE TYPING ADDED METHOD: CPT

## 2022-03-08 PROCEDURE — 36415 COLL VENOUS BLD VENIPUNCTURE: CPT | Performed by: PSYCHIATRY & NEUROLOGY

## 2022-03-08 PROCEDURE — 82728 ASSAY OF FERRITIN: CPT | Performed by: PSYCHIATRY & NEUROLOGY

## 2022-03-08 PROCEDURE — 80050 GENERAL HEALTH PANEL: CPT | Performed by: PSYCHIATRY & NEUROLOGY

## 2022-03-08 PROCEDURE — 3074F SYST BP LT 130 MM HG: CPT | Performed by: OBSTETRICS & GYNECOLOGY

## 2022-03-08 PROCEDURE — 3078F DIAST BP <80 MM HG: CPT | Performed by: OBSTETRICS & GYNECOLOGY

## 2022-03-08 PROCEDURE — 83550 IRON BINDING TEST: CPT | Performed by: PSYCHIATRY & NEUROLOGY

## 2022-03-08 PROCEDURE — 83036 HEMOGLOBIN GLYCOSYLATED A1C: CPT | Performed by: PSYCHIATRY & NEUROLOGY

## 2022-03-08 PROCEDURE — 99214 OFFICE O/P EST MOD 30 MIN: CPT | Performed by: OBSTETRICS & GYNECOLOGY

## 2022-03-08 PROCEDURE — 80061 LIPID PANEL: CPT | Performed by: PSYCHIATRY & NEUROLOGY

## 2022-03-08 PROCEDURE — 87186 SC STD MICRODIL/AGAR DIL: CPT | Performed by: OBSTETRICS & GYNECOLOGY

## 2022-03-08 PROCEDURE — 83540 ASSAY OF IRON: CPT | Performed by: PSYCHIATRY & NEUROLOGY

## 2022-03-08 RX ORDER — FLUCONAZOLE 150 MG/1
150 TABLET ORAL ONCE
Qty: 1 TABLET | Refills: 0 | Status: SHIPPED | OUTPATIENT
Start: 2022-03-08 | End: 2022-03-08

## 2022-03-08 ASSESSMENT — ENCOUNTER SYMPTOMS
DIARRHEA: 0
ABDOMINAL PAIN: 0
VOMITING: 0
ROS SKIN COMMENTS: HAIR LOSS
SORE THROAT: 0
FEVER: 0
COUGH: 0
CHILLS: 0
BLOOD IN STOOL: 0
NERVOUS/ANXIOUS: 0
TROUBLE SWALLOWING: 0
LIGHT-HEADEDNESS: 0
ALLERGIC/IMMUNOLOGIC NEGATIVE: 1
ENDOCRINE COMMENTS: HOT FLASHES
DIZZINESS: 0
FATIGUE: 0
CONSTIPATION: 0
NAUSEA: 0
WOUND: 0
SHORTNESS OF BREATH: 0
CHEST TIGHTNESS: 0
PHOTOPHOBIA: 0
WHEEZING: 0

## 2022-03-09 LAB — HBA1C MFR BLD: 5.5 % (ref 4.5–5.6)

## 2022-03-15 LAB
GENITAL VAGINOSIS SCREEN: NEGATIVE
TRICHOMONAS SCREEN: NEGATIVE

## 2022-08-08 NOTE — PROGRESS NOTES
[facility]  Obstetrics and Gynecology  Focused Gynecology Problem Exam  Irma Holly MD    Rafia Valenzuela is a 39year old female presenting for Gyn Exam (annual, vaginal discharge)  .     HPI:   Patient presents with:  Gyn Exam: annual, vaginal Hx of thoracic aortic aneurysm, followed outpt via echo.  Last imaging 2019 showed prox root 4.8 cm.   Today 5.0 prox with questionable dissection.  Echo imaging reviewed and given lack of clarity and hx of chest pain in July, recommending stat CT chest with contrast.  Pt states he is pain free today and outpt stat imaging is arranged      SURGICAL HISTORY  2008    left salpingectomy       Family History   Problem Relation Age of Onset   • Diabetes Father    • Heart Disorder Father    • Diabetes Mother    • Heart Disorder Mother    • Breast Cancer Other    • Ovarian Cancer Other    • Colon C with Clomid. 2.  Ovarian hyperstimulation syndrome which can include pain large ovarian cyst, ascites and at times hospital admission with significant morbidity.   Discussed lower risk of ovarian hyperstimulation syndrome with Clomid compared to other ovul

## 2023-05-15 ENCOUNTER — APPOINTMENT (OUTPATIENT)
Dept: INTERNAL MEDICINE | Age: 38
End: 2023-05-15

## 2023-05-16 ENCOUNTER — OFFICE VISIT (OUTPATIENT)
Dept: OBGYN CLINIC | Facility: CLINIC | Age: 38
End: 2023-05-16

## 2023-05-16 VITALS
HEART RATE: 72 BPM | BODY MASS INDEX: 36 KG/M2 | SYSTOLIC BLOOD PRESSURE: 126 MMHG | WEIGHT: 185 LBS | DIASTOLIC BLOOD PRESSURE: 88 MMHG

## 2023-05-16 DIAGNOSIS — N63.20 MASS OF LEFT BREAST, UNSPECIFIED QUADRANT: ICD-10-CM

## 2023-05-16 DIAGNOSIS — Z01.419 ENCOUNTER FOR WELL WOMAN EXAM WITH ROUTINE GYNECOLOGICAL EXAM: Primary | ICD-10-CM

## 2023-05-16 PROCEDURE — 99395 PREV VISIT EST AGE 18-39: CPT | Performed by: OBSTETRICS & GYNECOLOGY

## 2023-05-16 PROCEDURE — 3079F DIAST BP 80-89 MM HG: CPT | Performed by: OBSTETRICS & GYNECOLOGY

## 2023-05-16 PROCEDURE — 3074F SYST BP LT 130 MM HG: CPT | Performed by: OBSTETRICS & GYNECOLOGY

## 2023-05-17 LAB — HPV I/H RISK 1 DNA SPEC QL NAA+PROBE: NEGATIVE

## 2023-06-06 ENCOUNTER — HOSPITAL ENCOUNTER (OUTPATIENT)
Dept: ULTRASOUND IMAGING | Facility: HOSPITAL | Age: 38
Discharge: HOME | End: 2023-06-06
Attending: OBSTETRICS & GYNECOLOGY
Payer: COMMERCIAL

## 2023-06-06 ENCOUNTER — TELEPHONE (OUTPATIENT)
Dept: OBGYN CLINIC | Facility: CLINIC | Age: 38
End: 2023-06-06

## 2023-06-06 ENCOUNTER — HOSPITAL ENCOUNTER (OUTPATIENT)
Dept: MAMMOGRAPHY | Facility: HOSPITAL | Age: 38
Discharge: HOME | End: 2023-06-06
Attending: OBSTETRICS & GYNECOLOGY
Payer: COMMERCIAL

## 2023-06-06 DIAGNOSIS — N63.20 MASS OF LEFT BREAST, UNSPECIFIED QUADRANT: ICD-10-CM

## 2023-06-06 DIAGNOSIS — N63.20 MASS OF LEFT BREAST, UNSPECIFIED QUADRANT: Primary | ICD-10-CM

## 2023-06-06 PROCEDURE — 77066 DX MAMMO INCL CAD BI: CPT | Performed by: OBSTETRICS & GYNECOLOGY

## 2023-06-06 PROCEDURE — 77062 BREAST TOMOSYNTHESIS BI: CPT | Performed by: OBSTETRICS & GYNECOLOGY

## 2023-06-06 PROCEDURE — 76642 ULTRASOUND BREAST LIMITED: CPT | Performed by: OBSTETRICS & GYNECOLOGY

## 2023-06-06 NOTE — TELEPHONE ENCOUNTER
Orders just came in for a breast biopsy for this patient. Rep would like to schedule her for this Thursday but it needs pre-authorization. Please advise.

## 2023-06-07 NOTE — DISCHARGE INSTRUCTIONS
The Doctor (Radiologist) who performed your procedure was: DR. Interiano Other an ice pack over the biopsy site on top of your bra or on top of the ACE wrap (never apply ice directly over skin) for 10-15 minutes of every hour until bedtime for your comfort and to decrease bleeding. Keep your sports bra or the ACE wrap (stereotactic and MRI biopsy) in place for 24 hours after your biopsy. This compression decreases bleeding and breast movement for your comfort. Wear a supportive bra for the next couple of days for comfort (sports bra for sleep). Continue to wear, preferably, a sports bra or good supportive bra for 1 week and take off only to shower. No baths or showers during the first 24 hours after biopsy. After this time you may take a shower. It's okay if the strips get wet but do not soak them. NO saunas, hot tubs or swimming until steri-strips fall off (approx. 5 days). This prevents infection and allows time for them to completely close and heal.  DO NOT remove the steri-strips. They will fall off in 5 days. If any type of irritation (redness, itching or blisters) develops in the area around the steri-strips, remove them gently. If the steri-strips do not fall off after 5 days, gently remove them. Keep the area clean and dry. It is normal to have mild discomfort and bruising at the biopsy site. You may take Tylenol as needed for discomfort, as long as you have no allergies to Tylenol. Do not take aspirin, motrin, ibuprofen or any medication containing NSAID (non-steroidal anti-inflammatory drug) product for 48 hours. DO NOT participate in strenuous activity (aerobics, heavy lifting, housework, gardening, etc.) 48 hours after your biopsy to prevent bleeding. You will receive results in 2-3 business days. If you are having an MRI breast biopsy or an Ultrasound guided breast biopsy, you will be billed for the biopsy and unilateral mammogram separately.   If you have any questions about the procedure or your results, please contact the Breast Care Coordinator Nurse at [phone]. Notify your ordering physician or primary physician for increased bleeding, pain or fever over 100. Or contact a Radiology Nurse at [phone] between 8am-4pm (after 4pm, your call will be directed to the [place] [address] Emergency Room).

## 2023-06-08 ENCOUNTER — HOSPITAL ENCOUNTER (OUTPATIENT)
Dept: ULTRASOUND IMAGING | Facility: HOSPITAL | Age: 38
Discharge: HOME | End: 2023-06-08
Attending: OBSTETRICS & GYNECOLOGY
Payer: COMMERCIAL

## 2023-06-08 ENCOUNTER — HOSPITAL ENCOUNTER (OUTPATIENT)
Dept: MAMMOGRAPHY | Facility: HOSPITAL | Age: 38
Discharge: HOME | End: 2023-06-08
Attending: OBSTETRICS & GYNECOLOGY
Payer: COMMERCIAL

## 2023-06-08 DIAGNOSIS — N63.20 MASS OF LEFT BREAST: ICD-10-CM

## 2023-06-08 PROCEDURE — 77065 DX MAMMO INCL CAD UNI: CPT | Performed by: OBSTETRICS & GYNECOLOGY

## 2023-06-08 PROCEDURE — 19083 BX BREAST 1ST LESION US IMAG: CPT | Performed by: OBSTETRICS & GYNECOLOGY

## 2023-06-08 PROCEDURE — 88305 TISSUE EXAM BY PATHOLOGIST: CPT | Performed by: OBSTETRICS & GYNECOLOGY

## 2023-06-09 ENCOUNTER — TELEPHONE (OUTPATIENT)
Dept: ULTRASOUND IMAGING | Facility: HOSPITAL | Age: 38
End: 2023-06-09

## 2023-06-12 ENCOUNTER — TELEPHONE (OUTPATIENT)
Dept: ULTRASOUND IMAGING | Facility: HOSPITAL | Age: 38
End: 2023-06-12

## 2023-08-15 ENCOUNTER — PATIENT MESSAGE (OUTPATIENT)
Dept: OBGYN CLINIC | Facility: CLINIC | Age: 38
End: 2023-08-15

## 2023-08-15 DIAGNOSIS — N63.20 MASS OF LEFT BREAST, UNSPECIFIED QUADRANT: Primary | ICD-10-CM

## 2023-08-15 NOTE — TELEPHONE ENCOUNTER
From: Radha Samaniego  To: Shahid Valentino MD, MD  Sent: 8/15/2023 1:20 PM CDT  Subject: Regarding referral     Hi on June 8th I had a breast biopsy, the insurance is saying they never received the referral for that procedure. Can you guys send me the referral so I can summit it to my insurance. Thank you!

## 2023-11-30 ENCOUNTER — LAB ENCOUNTER (OUTPATIENT)
Dept: LAB | Facility: REFERENCE LAB | Age: 38
End: 2023-11-30
Attending: FAMILY MEDICINE
Payer: COMMERCIAL

## 2023-11-30 ENCOUNTER — OFFICE VISIT (OUTPATIENT)
Facility: CLINIC | Age: 38
End: 2023-11-30
Payer: COMMERCIAL

## 2023-11-30 VITALS
TEMPERATURE: 98 F | SYSTOLIC BLOOD PRESSURE: 110 MMHG | BODY MASS INDEX: 35.76 KG/M2 | OXYGEN SATURATION: 98 % | DIASTOLIC BLOOD PRESSURE: 76 MMHG | HEART RATE: 87 BPM | WEIGHT: 187 LBS | HEIGHT: 60.63 IN

## 2023-11-30 DIAGNOSIS — E66.9 OBESITY (BMI 30-39.9): ICD-10-CM

## 2023-11-30 DIAGNOSIS — R42 DIZZINESS: ICD-10-CM

## 2023-11-30 DIAGNOSIS — Z00.00 ENCOUNTER FOR ANNUAL PHYSICAL EXAM: ICD-10-CM

## 2023-11-30 DIAGNOSIS — R53.83 FATIGUE, UNSPECIFIED TYPE: ICD-10-CM

## 2023-11-30 DIAGNOSIS — R10.84 GENERALIZED ABDOMINAL PAIN: ICD-10-CM

## 2023-11-30 DIAGNOSIS — D64.9 ANEMIA, UNSPECIFIED TYPE: ICD-10-CM

## 2023-11-30 DIAGNOSIS — R63.5 WEIGHT GAIN: ICD-10-CM

## 2023-11-30 DIAGNOSIS — R92.8 ABNORMAL MAMMOGRAM: ICD-10-CM

## 2023-11-30 DIAGNOSIS — Z00.00 ENCOUNTER FOR ANNUAL PHYSICAL EXAM: Primary | ICD-10-CM

## 2023-11-30 LAB
ALBUMIN SERPL-MCNC: 4.3 G/DL (ref 3.2–4.8)
ALBUMIN/GLOB SERPL: 1.6 {RATIO} (ref 1–2)
ALP LIVER SERPL-CCNC: 49 U/L
ALT SERPL-CCNC: 17 U/L
ANION GAP SERPL CALC-SCNC: 7 MMOL/L (ref 0–18)
AST SERPL-CCNC: 20 U/L (ref ?–34)
BASOPHILS # BLD AUTO: 0.06 X10(3) UL (ref 0–0.2)
BASOPHILS NFR BLD AUTO: 0.7 %
BILIRUB SERPL-MCNC: 0.2 MG/DL (ref 0.3–1.2)
BUN BLD-MCNC: 8 MG/DL (ref 9–23)
BUN/CREAT SERPL: 14.3 (ref 10–20)
CALCIUM BLD-MCNC: 9.1 MG/DL (ref 8.7–10.4)
CHLORIDE SERPL-SCNC: 107 MMOL/L (ref 98–112)
CHOLEST SERPL-MCNC: 187 MG/DL (ref ?–200)
CO2 SERPL-SCNC: 26 MMOL/L (ref 21–32)
CREAT BLD-MCNC: 0.56 MG/DL
DEPRECATED RDW RBC AUTO: 43.7 FL (ref 35.1–46.3)
EGFRCR SERPLBLD CKD-EPI 2021: 120 ML/MIN/1.73M2 (ref 60–?)
EOSINOPHIL # BLD AUTO: 0.38 X10(3) UL (ref 0–0.7)
EOSINOPHIL NFR BLD AUTO: 4.1 %
ERYTHROCYTE [DISTWIDTH] IN BLOOD BY AUTOMATED COUNT: 15 % (ref 11–15)
EST. AVERAGE GLUCOSE BLD GHB EST-MCNC: 120 MG/DL (ref 68–126)
FASTING PATIENT LIPID ANSWER: NO
FASTING STATUS PATIENT QL REPORTED: NO
GLOBULIN PLAS-MCNC: 2.7 G/DL (ref 2.8–4.4)
GLUCOSE BLD-MCNC: 97 MG/DL (ref 70–99)
HBA1C MFR BLD: 5.8 % (ref ?–5.7)
HCT VFR BLD AUTO: 34.3 %
HCV AB SERPL QL IA: NONREACTIVE
HDLC SERPL-MCNC: 50 MG/DL (ref 40–59)
HGB BLD-MCNC: 10.8 G/DL
IMM GRANULOCYTES # BLD AUTO: 0.01 X10(3) UL (ref 0–1)
IMM GRANULOCYTES NFR BLD: 0.1 %
LDLC SERPL CALC-MCNC: 106 MG/DL (ref ?–100)
LYMPHOCYTES # BLD AUTO: 2.19 X10(3) UL (ref 1–4)
LYMPHOCYTES NFR BLD AUTO: 23.8 %
MCH RBC QN AUTO: 25.2 PG (ref 26–34)
MCHC RBC AUTO-ENTMCNC: 31.5 G/DL (ref 31–37)
MCV RBC AUTO: 80 FL
MONOCYTES # BLD AUTO: 0.48 X10(3) UL (ref 0.1–1)
MONOCYTES NFR BLD AUTO: 5.2 %
NEUTROPHILS # BLD AUTO: 6.1 X10 (3) UL (ref 1.5–7.7)
NEUTROPHILS # BLD AUTO: 6.1 X10(3) UL (ref 1.5–7.7)
NEUTROPHILS NFR BLD AUTO: 66.1 %
NONHDLC SERPL-MCNC: 137 MG/DL (ref ?–130)
OSMOLALITY SERPL CALC.SUM OF ELEC: 288 MOSM/KG (ref 275–295)
PLATELET # BLD AUTO: 233 10(3)UL (ref 150–450)
POTASSIUM SERPL-SCNC: 3.5 MMOL/L (ref 3.5–5.1)
PROT SERPL-MCNC: 7 G/DL (ref 5.7–8.2)
RBC # BLD AUTO: 4.29 X10(6)UL
SODIUM SERPL-SCNC: 140 MMOL/L (ref 136–145)
TRIGL SERPL-MCNC: 179 MG/DL (ref 30–149)
TSI SER-ACNC: 0.59 MIU/ML (ref 0.55–4.78)
VIT D+METAB SERPL-MCNC: 14.3 NG/ML (ref 30–100)
VLDLC SERPL CALC-MCNC: 30 MG/DL (ref 0–30)
WBC # BLD AUTO: 9.2 X10(3) UL (ref 4–11)

## 2023-11-30 PROCEDURE — 85025 COMPLETE CBC W/AUTO DIFF WBC: CPT

## 2023-11-30 PROCEDURE — 99203 OFFICE O/P NEW LOW 30 MIN: CPT | Performed by: FAMILY MEDICINE

## 2023-11-30 PROCEDURE — 84443 ASSAY THYROID STIM HORMONE: CPT

## 2023-11-30 PROCEDURE — 83540 ASSAY OF IRON: CPT

## 2023-11-30 PROCEDURE — 83036 HEMOGLOBIN GLYCOSYLATED A1C: CPT

## 2023-11-30 PROCEDURE — 84466 ASSAY OF TRANSFERRIN: CPT

## 2023-11-30 PROCEDURE — 80053 COMPREHEN METABOLIC PANEL: CPT

## 2023-11-30 PROCEDURE — 80061 LIPID PANEL: CPT

## 2023-11-30 PROCEDURE — 3078F DIAST BP <80 MM HG: CPT | Performed by: FAMILY MEDICINE

## 2023-11-30 PROCEDURE — 3074F SYST BP LT 130 MM HG: CPT | Performed by: FAMILY MEDICINE

## 2023-11-30 PROCEDURE — 82306 VITAMIN D 25 HYDROXY: CPT

## 2023-11-30 PROCEDURE — 36415 COLL VENOUS BLD VENIPUNCTURE: CPT

## 2023-11-30 PROCEDURE — 3008F BODY MASS INDEX DOCD: CPT | Performed by: FAMILY MEDICINE

## 2023-11-30 PROCEDURE — 86803 HEPATITIS C AB TEST: CPT

## 2023-11-30 PROCEDURE — 82728 ASSAY OF FERRITIN: CPT

## 2023-11-30 PROCEDURE — 99385 PREV VISIT NEW AGE 18-39: CPT | Performed by: FAMILY MEDICINE

## 2023-11-30 RX ORDER — PHENTERMINE HYDROCHLORIDE 15 MG/1
15 CAPSULE ORAL EVERY MORNING
Qty: 30 CAPSULE | Refills: 0 | Status: SHIPPED | OUTPATIENT
Start: 2023-11-30

## 2023-12-01 DIAGNOSIS — D50.9 IRON DEFICIENCY ANEMIA, UNSPECIFIED IRON DEFICIENCY ANEMIA TYPE: Primary | ICD-10-CM

## 2023-12-01 DIAGNOSIS — D64.9 ANEMIA, UNSPECIFIED TYPE: ICD-10-CM

## 2023-12-01 DIAGNOSIS — E55.9 VITAMIN D DEFICIENCY: Primary | ICD-10-CM

## 2023-12-01 LAB
DEPRECATED HBV CORE AB SER IA-ACNC: 1.1 NG/ML
IRON SATN MFR SERPL: 7 %
IRON SERPL-MCNC: 29 UG/DL
TIBC SERPL-MCNC: 437 UG/DL (ref 250–425)
TRANSFERRIN SERPL-MCNC: 293 MG/DL (ref 250–380)

## 2023-12-01 RX ORDER — ERGOCALCIFEROL 1.25 MG/1
50000 CAPSULE ORAL WEEKLY
Qty: 12 CAPSULE | Refills: 0 | Status: SHIPPED | OUTPATIENT
Start: 2023-12-01 | End: 2024-02-17

## 2023-12-07 ENCOUNTER — OFFICE VISIT (OUTPATIENT)
Dept: FAMILY MEDICINE CLINIC | Facility: CLINIC | Age: 38
End: 2023-12-07
Payer: COMMERCIAL

## 2023-12-07 VITALS
SYSTOLIC BLOOD PRESSURE: 120 MMHG | TEMPERATURE: 99 F | HEART RATE: 78 BPM | RESPIRATION RATE: 18 BRPM | OXYGEN SATURATION: 99 % | DIASTOLIC BLOOD PRESSURE: 70 MMHG | BODY MASS INDEX: 35 KG/M2 | WEIGHT: 183 LBS

## 2023-12-07 DIAGNOSIS — J02.9 SORE THROAT: ICD-10-CM

## 2023-12-07 DIAGNOSIS — U07.1 COVID-19 VIRUS INFECTION: Primary | ICD-10-CM

## 2023-12-07 LAB
CONTROL LINE PRESENT WITH A CLEAR BACKGROUND (YES/NO): YES YES/NO
KIT LOT #: NORMAL NUMERIC
OPERATOR ID: ABNORMAL
POCT LOT NUMBER: ABNORMAL
RAPID SARS-COV-2 BY PCR: DETECTED
STREP GRP A CUL-SCR: NEGATIVE

## 2023-12-07 PROCEDURE — 99213 OFFICE O/P EST LOW 20 MIN: CPT | Performed by: NURSE PRACTITIONER

## 2023-12-07 PROCEDURE — U0002 COVID-19 LAB TEST NON-CDC: HCPCS | Performed by: NURSE PRACTITIONER

## 2023-12-07 PROCEDURE — 87880 STREP A ASSAY W/OPTIC: CPT | Performed by: NURSE PRACTITIONER

## 2023-12-07 PROCEDURE — 3078F DIAST BP <80 MM HG: CPT | Performed by: NURSE PRACTITIONER

## 2023-12-07 PROCEDURE — 3074F SYST BP LT 130 MM HG: CPT | Performed by: NURSE PRACTITIONER

## 2023-12-07 NOTE — PATIENT INSTRUCTIONS
Dear Karlos Alpers,    Your COVID 19 test results are POSITIVE. Please start isolation and the guidelines are to isolate for 5 days from start of symptoms or a positive test, whichever came first. You can come out of isolation after 5 days as long as you are fever free without using a fever reducing medication and the majority of your symptoms are gone. If you still have a fever, continue to stay home until your fever resolves. You do still need to wear a well fitting mask for the following 5 days when around others. We don't typically recommend retesting with a PCR test because that can come back positive for a few months. You can retest with a rapid test (home test or pharmacy/pop up Covid testing site) if you would like. We recommend a \"sick room\" if you share a home with people who are not symptomatic. Continue to wipe surfaces and wash hands frequently. Covid is a virus so you just have to try symptom management with over the counter medication. Please get plenty of rest and increase your intake of oral fluids. General comfort measures:  Get rest!  Hydrate! (cold or hot based on comfort). Drink lots of water or other non dehydrating liquids to help with illness. Salty foods, soups and tea can help with throat pain. Hand washing-use hand  or wash hands frequently, cover your cough or sneeze, do not share towels or drinks with others. Salt water gargles (1 tsp. Salt in 6 oz lukewarm water): gargle for 2 minutes, repeat every 15 minutes as needed to help decrease swelling and relieve pain. Use humidified air, steamy showers/baths and use vaporizer in sleeping quarters to keep secretions thin. Avoid smoking. Symptom management:    Nasal congestion/Post-nasal-drip: Saline nasal spray to nostrils to help remove drainage or an antihistamine to help dry up drainage. Sinus congestion/Post-nasal-drip: OTC Nasacort or Flonase (steroid nasal spray) nightly for 2 weeks.  May take Sudafed (D) or Sudafed-PE, if not contraindicated (do not take if you have HTN). Pain/discomfort:  May use Tylenol or Ibuprofen, if not contraindicated. Cough: May take DM-dextromethorophan over the counter (long lasting). Ex: Delsym  Chest congestion:  May take guaifenesin with a lot of water. Ex:  Plain Mucinex. Sore throat:  Cepacol lozenges or Chloroseptic throat spray (active ingredient Benzocaine). Follow up with your PCP in 1-2 weeks if not better. Follow up in a few days if worsening symptoms. Seek immediate care if inability to swallow or breathe. We recommend you check your temperature and buy a pulse oximeter ([facility] or check local pharmacy) so you can monitor your blood oxygenation a few times a day. Monitor for worsening symptoms. If you experience any chest pain or shortness of breath, you should go to ER. You should notify any close contacts from up to 2 days prior to the onset of your symptoms.

## 2024-03-04 ENCOUNTER — LAB ENCOUNTER (OUTPATIENT)
Dept: LAB | Facility: REFERENCE LAB | Age: 39
End: 2024-03-04
Attending: FAMILY MEDICINE
Payer: COMMERCIAL

## 2024-03-04 ENCOUNTER — OFFICE VISIT (OUTPATIENT)
Facility: CLINIC | Age: 39
End: 2024-03-04
Payer: COMMERCIAL

## 2024-03-04 VITALS
WEIGHT: 187 LBS | HEART RATE: 72 BPM | HEIGHT: 60.5 IN | DIASTOLIC BLOOD PRESSURE: 78 MMHG | SYSTOLIC BLOOD PRESSURE: 124 MMHG | OXYGEN SATURATION: 98 % | BODY MASS INDEX: 35.76 KG/M2

## 2024-03-04 DIAGNOSIS — D50.0 IRON DEFICIENCY ANEMIA DUE TO CHRONIC BLOOD LOSS: ICD-10-CM

## 2024-03-04 DIAGNOSIS — R73.03 PREDIABETES: ICD-10-CM

## 2024-03-04 DIAGNOSIS — E55.9 VITAMIN D DEFICIENCY: ICD-10-CM

## 2024-03-04 DIAGNOSIS — E66.9 OBESITY (BMI 30-39.9): Primary | ICD-10-CM

## 2024-03-04 DIAGNOSIS — D50.9 IRON DEFICIENCY ANEMIA, UNSPECIFIED IRON DEFICIENCY ANEMIA TYPE: ICD-10-CM

## 2024-03-04 DIAGNOSIS — L98.9 LESION OF FACE: ICD-10-CM

## 2024-03-04 DIAGNOSIS — N62 MACROMASTIA: ICD-10-CM

## 2024-03-04 DIAGNOSIS — R73.09 ELEVATED HEMOGLOBIN A1C: ICD-10-CM

## 2024-03-04 LAB
BASOPHILS # BLD AUTO: 0.04 X10(3) UL (ref 0–0.2)
BASOPHILS NFR BLD AUTO: 0.6 %
DEPRECATED HBV CORE AB SER IA-ACNC: 2.5 NG/ML
DEPRECATED RDW RBC AUTO: 48.4 FL (ref 35.1–46.3)
EOSINOPHIL # BLD AUTO: 0.2 X10(3) UL (ref 0–0.7)
EOSINOPHIL NFR BLD AUTO: 2.8 %
ERYTHROCYTE [DISTWIDTH] IN BLOOD BY AUTOMATED COUNT: 16.2 % (ref 11–15)
EST. AVERAGE GLUCOSE BLD GHB EST-MCNC: 117 MG/DL (ref 68–126)
HBA1C MFR BLD: 5.7 % (ref ?–5.7)
HCT VFR BLD AUTO: 36.5 %
HGB BLD-MCNC: 11.3 G/DL
IMM GRANULOCYTES # BLD AUTO: 0.02 X10(3) UL (ref 0–1)
IMM GRANULOCYTES NFR BLD: 0.3 %
IRON SATN MFR SERPL: 6 %
IRON SERPL-MCNC: 26 UG/DL
LYMPHOCYTES # BLD AUTO: 1.99 X10(3) UL (ref 1–4)
LYMPHOCYTES NFR BLD AUTO: 28.3 %
MCH RBC QN AUTO: 25.5 PG (ref 26–34)
MCHC RBC AUTO-ENTMCNC: 31 G/DL (ref 31–37)
MCV RBC AUTO: 82.2 FL
MONOCYTES # BLD AUTO: 0.36 X10(3) UL (ref 0.1–1)
MONOCYTES NFR BLD AUTO: 5.1 %
NEUTROPHILS # BLD AUTO: 4.43 X10 (3) UL (ref 1.5–7.7)
NEUTROPHILS # BLD AUTO: 4.43 X10(3) UL (ref 1.5–7.7)
NEUTROPHILS NFR BLD AUTO: 62.9 %
PLATELET # BLD AUTO: 234 10(3)UL (ref 150–450)
RBC # BLD AUTO: 4.44 X10(6)UL
TIBC SERPL-MCNC: 432 UG/DL (ref 250–425)
TRANSFERRIN SERPL-MCNC: 290 MG/DL (ref 250–380)
VIT D+METAB SERPL-MCNC: 17.7 NG/ML (ref 30–100)
WBC # BLD AUTO: 7 X10(3) UL (ref 4–11)

## 2024-03-04 PROCEDURE — 36415 COLL VENOUS BLD VENIPUNCTURE: CPT

## 2024-03-04 PROCEDURE — 83036 HEMOGLOBIN GLYCOSYLATED A1C: CPT

## 2024-03-04 PROCEDURE — 99214 OFFICE O/P EST MOD 30 MIN: CPT | Performed by: FAMILY MEDICINE

## 2024-03-04 PROCEDURE — 84466 ASSAY OF TRANSFERRIN: CPT

## 2024-03-04 PROCEDURE — 82306 VITAMIN D 25 HYDROXY: CPT

## 2024-03-04 PROCEDURE — 82728 ASSAY OF FERRITIN: CPT

## 2024-03-04 PROCEDURE — 83540 ASSAY OF IRON: CPT

## 2024-03-04 PROCEDURE — 85025 COMPLETE CBC W/AUTO DIFF WBC: CPT

## 2024-03-04 PROCEDURE — 3074F SYST BP LT 130 MM HG: CPT | Performed by: FAMILY MEDICINE

## 2024-03-04 PROCEDURE — 3008F BODY MASS INDEX DOCD: CPT | Performed by: FAMILY MEDICINE

## 2024-03-04 PROCEDURE — 3078F DIAST BP <80 MM HG: CPT | Performed by: FAMILY MEDICINE

## 2024-03-04 RX ORDER — PHENTERMINE HYDROCHLORIDE 30 MG/1
30 CAPSULE ORAL EVERY MORNING
Qty: 30 CAPSULE | Refills: 2 | Status: SHIPPED | OUTPATIENT
Start: 2024-03-04

## 2024-03-04 NOTE — PROGRESS NOTES
CC:    Chief Complaint   Patient presents with    Medication Follow-Up       HPI: 38 year old female here to follow-up on medication for weight loss and iron deficiency anemia.   Started Phentermine 15 mg daily at the end of November, but ran out in January and was not able to get a refill.   She did feel more energized, and also noticed she was less hungry on that dose as well.   She did not have any adverse side effects either.   She does go for walks frequently now, and also cut out tortilla intake.   Also eating more vegetables and chicken.   She does not feel like she eats a lot, and she has stopped buying [employer] coffee daily.   Did purchase Slow Fe iron, and does take it about twice a week as she forgets to take it the other days.  Denies any adverse side effects with the medication, and does feel less dizzy now.   Tries to drink about 100 ounces of water a day.   Finished taking the high dose vitamin d supplement, but only took it for about 4 weeks as that is all the pharmacy gave her.   Also concerned with her large breasts as they lead to a lot of back pain and difficulties wearing certain bras.     ROS:  General:  No fever, fatigue, no weight changes  HEENT:  Denies congestion or nasal discharge  Cardio:  No chest pain, no palpitations   Pulmonary:  No cough, no SOB  GI:  No N/V/D  Dermatologic: Changing right cheek mole  Neuro: improved dizziness, no headaches      Past Medical History:   Diagnosis Date    Ectopic pregnancy (HCC)     PONV (postoperative nausea and vomiting)        Social History     Socioeconomic History    Marital status:      Spouse name: Not on file    Number of children: Not on file    Years of education: Not on file    Highest education level: Not on file   Occupational History    Not on file   Tobacco Use    Smoking status: Never    Smokeless tobacco: Never   Vaping Use    Vaping Use: Never used   Substance and Sexual Activity    Alcohol use: Never    Drug use: Never     Sexual activity: Not on file   Other Topics Concern    Caffeine Concern No    Exercise No    Seat Belt No    Special Diet No    Stress Concern No    Weight Concern No   Social History Narrative    Not on file     Social Determinants of Health     Financial Resource Strain: Not on file   Food Insecurity: Not on file   Transportation Needs: Not on file   Physical Activity: Not on file   Stress: Not on file   Social Connections: Not on file   Housing Stability: Not on file       Current Outpatient Medications   Medication Sig Dispense Refill    Phentermine HCl 15 MG Oral Cap Take 1 capsule (15 mg total) by mouth every morning. 30 capsule 0       Patient has no known allergies.      Vitals:   Vitals:    03/04/24 0857   BP: 124/78   Pulse: 72   SpO2: 98%   Weight: 187 lb (84.8 kg)   Height: 5' 0.5\" (1.537 m)     Wt Readings from Last 6 Encounters:   03/04/24 187 lb (84.8 kg)   12/07/23 183 lb (83 kg)   11/30/23 187 lb (84.8 kg)   05/16/23 185 lb (83.9 kg)   03/08/22 187 lb 3.2 oz (84.9 kg)   12/04/21 192 lb 9.6 oz (87.4 kg)         Body mass index is 35.92 kg/m².    Physical:  General:  Alert, appropriate, no acute distress   HEENT: supple, no lymphadenopathy   Cardio:  RRR, no murmurs, S1, S2  Dermatologic:  Right cheek with hyperpigmented papule     Assessment and Plan: 38 year old female here for follow-up on weight, iron deficiency, and skin concern.     1. Obesity (BMI 30-39.9)    - Had lost weight on Phentermine and was tolerating it well, but she ran out of medication and felt she could do better on the higher dose so will increase to 30 mg daily   - Will continue to monitor for side effects and check her blood pressure at work, and follow-up in 3-4 months   - Phentermine HCl 30 MG Oral Cap; Take 1 capsule (30 mg total) by mouth every morning.  Dispense: 30 capsule; Refill: 2    2. Vitamin D deficiency    - Advise repeating vitamin d level today but also to contact pharmacy to fill the rest of her 12 week  prescription     3. Iron deficiency anemia due to chronic blood loss    - Repeat iron studies and CBC, but also to try to be more consistent with taking her iron at least every other day    4. Elevated hemoglobin A1c    - Repeat A1C after dietary improvements and more regular exercise    5. Lesion of face    - Referral to dermatologist given  - Derm Referral - In Network    6. Macromastia    - Given large breasts have led to back pain and limiting ability to exercise strenuously referral to plastic surgeon given to discuss reduction   - Plastic Surgery Referral - [facility] ([facility])      Vivienne Manning DO  03/04/24  9:04 AM

## 2024-03-13 ENCOUNTER — APPOINTMENT (OUTPATIENT)
Dept: URBAN - METROPOLITAN AREA CLINIC 244 | Age: 39
Setting detail: DERMATOLOGY
End: 2024-03-15

## 2024-03-13 DIAGNOSIS — D18.0 HEMANGIOMA: ICD-10-CM

## 2024-03-13 DIAGNOSIS — D22 MELANOCYTIC NEVI: ICD-10-CM

## 2024-03-13 DIAGNOSIS — L73.8 OTHER SPECIFIED FOLLICULAR DISORDERS: ICD-10-CM

## 2024-03-13 PROBLEM — D22.62 MELANOCYTIC NEVI OF LEFT UPPER LIMB, INCLUDING SHOULDER: Status: ACTIVE | Noted: 2024-03-13

## 2024-03-13 PROBLEM — D22.39 MELANOCYTIC NEVI OF OTHER PARTS OF FACE: Status: ACTIVE | Noted: 2024-03-13

## 2024-03-13 PROBLEM — D18.01 HEMANGIOMA OF SKIN AND SUBCUTANEOUS TISSUE: Status: ACTIVE | Noted: 2024-03-13

## 2024-03-13 PROCEDURE — 11302 SHAVE SKIN LESION 1.1-2.0 CM: CPT

## 2024-03-13 PROCEDURE — OTHER SHAVE REMOVAL: OTHER

## 2024-03-13 PROCEDURE — OTHER COUNSELING: OTHER

## 2024-03-13 PROCEDURE — 88305 TISSUE EXAM BY PATHOLOGIST: CPT | Performed by: STUDENT IN AN ORGANIZED HEALTH CARE EDUCATION/TRAINING PROGRAM

## 2024-03-13 PROCEDURE — 99203 OFFICE O/P NEW LOW 30 MIN: CPT | Mod: 25

## 2024-03-13 ASSESSMENT — LOCATION DETAILED DESCRIPTION DERM
LOCATION DETAILED: RIGHT LATERAL BUCCAL CHEEK
LOCATION DETAILED: NASAL ROOT
LOCATION DETAILED: LEFT POSTERIOR AXILLA
LOCATION DETAILED: RIGHT CYMBA CONCHA
LOCATION DETAILED: RIGHT MEDIAL MALAR CHEEK

## 2024-03-13 ASSESSMENT — LOCATION SIMPLE DESCRIPTION DERM
LOCATION SIMPLE: RIGHT EAR
LOCATION SIMPLE: RIGHT CHEEK
LOCATION SIMPLE: LEFT POSTERIOR AXILLA
LOCATION SIMPLE: NOSE

## 2024-03-13 ASSESSMENT — LOCATION ZONE DERM
LOCATION ZONE: FACE
LOCATION ZONE: EAR
LOCATION ZONE: NOSE
LOCATION ZONE: AXILLAE

## 2024-03-13 NOTE — PROCEDURE: SHAVE REMOVAL
Add Variable For Additional Medical Justification: No
X Size Of Lesion In Cm (Optional): 0
Depth Of Shave: dermis
Size Of Lesion In Cm (Required): 1.2
Biopsy Method: double edge Personna blade
Consent was obtained from the patient. The risks and benefits were discussed in detail. Specifically, the risks of infection, scarring, scabbing, bleeding, prolonged wound healing, incomplete removal, allergy to anesthesia, nerve injury and recurrence were addressed. Prior to the procedure, the treatment site was clearly identified and confirmed by the patient. The scar produced with a shave removal  is often suboptimal and appears atrophic and white. Patient understands this and prefers shave removal instead of a punch removal or traditional excision.\\n\\nPatient aware that I cannot guarantee cosmetic outcome of biopsy and biopsy is done for medical purposes (not cosmetic).\\n\\nPatient aware that sometimes removal/biopsy results can be inconclusive and that another biopsy or procedure may be required. \\nPatient aware that another procedure may be required pending the diagnosis.
Lab: -[id number]
Medical Necessity Clause: This procedure was medically necessary because the lesion that was treated was:
Billing Type: Third-Party Bill
Detail Level: Detailed
Medical Necessity Information: It is in your best interest to select a reason for this procedure from the list below. All of these items fulfill various CMS LCD requirements except the new and changing color options.
Notification Instructions: Patient will be notified of pathology results. However, patient instructed to call the office if not contacted within 2 weeks.
Wound Care: Petrolatum
Hemostasis: Drysol
Post-Care Instructions: I reviewed with the patient in detail post-care instructions.\\n\\n Following the removal petrolatum and a bandage were applied. Patient will be notified of biopsy results. However, patient instructed to call the office if not contacted within 2 weeks. Reviewed with the patient in detail post-care instructions. Patient is to keep the area dry for 48 hours, and not to engage in any swimming until the area is healed. If patient does have water exposure, recommend waterproof (hydrocolloid dressing) application. Should the patient develop any fevers, chills, bleeding, severe pain, then the patient will contact the office immediately.\\n\\n** The patient was explicitly instructed to NOT apply any other topicals to the area other than plain vaseline with a clean Q-tip **. No Neosporin or topical Ab unless prescribed/recommended by the Dr is to be used.\\n\\nThe patient is aware to have the area re-evaluated if it is not healed
Render Post-Care Instructions In Note?: yes
Anesthesia Type: 0.5% lidocaine with 1:200,000 epinephrine and a 1:10 solution of 8.4% sodium bicarbonate

## 2024-03-15 ENCOUNTER — LAB REQUISITION (OUTPATIENT)
Dept: LAB | Facility: HOSPITAL | Age: 39
End: 2024-03-15
Payer: COMMERCIAL

## 2024-03-15 DIAGNOSIS — D22.62 MELANOCYTIC NEVI OF LEFT UPPER LIMB, INCLUDING SHOULDER: ICD-10-CM

## 2024-03-17 ENCOUNTER — OFFICE VISIT (OUTPATIENT)
Dept: FAMILY MEDICINE CLINIC | Facility: CLINIC | Age: 39
End: 2024-03-17
Payer: COMMERCIAL

## 2024-03-17 VITALS
HEIGHT: 60.6 IN | OXYGEN SATURATION: 100 % | HEART RATE: 75 BPM | DIASTOLIC BLOOD PRESSURE: 92 MMHG | WEIGHT: 184 LBS | TEMPERATURE: 97 F | SYSTOLIC BLOOD PRESSURE: 117 MMHG | BODY MASS INDEX: 35.19 KG/M2 | RESPIRATION RATE: 18 BRPM

## 2024-03-17 DIAGNOSIS — B30.9 VIRAL CONJUNCTIVITIS OF RIGHT EYE: ICD-10-CM

## 2024-03-17 DIAGNOSIS — J02.9 ACUTE PHARYNGITIS, UNSPECIFIED ETIOLOGY: Primary | ICD-10-CM

## 2024-03-17 LAB
CONTROL LINE PRESENT WITH A CLEAR BACKGROUND (YES/NO): YES YES/NO
KIT LOT #: NORMAL NUMERIC
OPERATOR ID: NORMAL
RAPID SARS-COV-2 BY PCR: NOT DETECTED
STREP GRP A CUL-SCR: NEGATIVE

## 2024-03-17 PROCEDURE — 3008F BODY MASS INDEX DOCD: CPT | Performed by: NURSE PRACTITIONER

## 2024-03-17 PROCEDURE — 99213 OFFICE O/P EST LOW 20 MIN: CPT | Performed by: NURSE PRACTITIONER

## 2024-03-17 PROCEDURE — U0002 COVID-19 LAB TEST NON-CDC: HCPCS | Performed by: NURSE PRACTITIONER

## 2024-03-17 PROCEDURE — 3074F SYST BP LT 130 MM HG: CPT | Performed by: NURSE PRACTITIONER

## 2024-03-17 PROCEDURE — 87081 CULTURE SCREEN ONLY: CPT | Performed by: NURSE PRACTITIONER

## 2024-03-17 PROCEDURE — 87880 STREP A ASSAY W/OPTIC: CPT | Performed by: NURSE PRACTITIONER

## 2024-03-17 PROCEDURE — 3080F DIAST BP >= 90 MM HG: CPT | Performed by: NURSE PRACTITIONER

## 2024-03-17 NOTE — PATIENT INSTRUCTIONS
Tylenol/Ibuprofen with pain/fever.  May gargle with warm salt water 2 times per day for at least 3 days.  Negative Rapid Strep in clinic. Results discussed. Will send out a throat culture to verify results.  Take medication as prescribed.  Do not share utensils or drinks with anyone.  Wash hands or use alcohol-based hand .  Patient/Family understands and agrees with plan.

## 2024-03-17 NOTE — PROGRESS NOTES
CHIEF COMPLAINT:     Chief Complaint   Patient presents with    Sore Throat     Possible pink eye and sore throat since Wednesday - Entered by patient  Pink eye in right eye; pt wants to be tested for strep and Covid, exposed to Covid        HPI:   Mandi Martinez is a 38 year old female who presents c/o sore throat and crusty right eye. Patient reports being exposed to COVID from a co-worker at work. Patient developed a sore throat on Wednesday that has progressively worsened. By Thursday/Friday she was starting to feel better and returned to work. Yesterday her sore throat was worse. She reports it hurt so much she did not want to swallow. Today she woke up with crusting on her right eye and it is watering. Denies fever, nasal congestion/pressure, inability to swallow, drooling, change in appetite, sleep issues, body aches, sob, cp, allergies or post nasal drip. Patient has been taking ibuprofen with some relief. Denies at home COVID testing.       Current Outpatient Medications   Medication Sig Dispense Refill    Ferrous Sulfate  (45 Fe) MG Oral Tab CR Take 1 tablet (142 mg total) by mouth daily.      Phentermine HCl 30 MG Oral Cap Take 1 capsule (30 mg total) by mouth every morning. 30 capsule 2      Past Medical History:   Diagnosis Date    Ectopic pregnancy (HCC)     PONV (postoperative nausea and vomiting)       Past Surgical History:   Procedure Laterality Date          LAPAROSCOPY,PELVIC,BIOPSY Right 2020    robotic ovarian cystectomy and lyis of adhesions    NEEDLE BIOPSY LEFT Left 2023    US bx    OTHER SURGICAL HISTORY      left salpingectomy         Social History     Socioeconomic History    Marital status:    Tobacco Use    Smoking status: Never    Smokeless tobacco: Never   Vaping Use    Vaping Use: Never used   Substance and Sexual Activity    Alcohol use: Never    Drug use: Never   Other Topics Concern    Caffeine Concern No    Exercise No    Seat Belt No     Special Diet No    Stress Concern No    Weight Concern No         REVIEW OF SYSTEMS:   GENERAL: Unchanged appetite  SKIN: no rashes or abnormal skin lesions  HEENT: See HPI  LUNGS: denies shortness of breath or wheezing, See HPI  CARDIOVASCULAR: denies chest pain or palpitations   GI: denies N/V/C or abdominal pain  NEURO: Denies dizziness or weakness    EXAM:   BP (!) 117/92   Pulse 75   Temp 97.2 °F (36.2 °C) (Temporal)   Resp 18   Ht 5' 0.6\" (1.539 m)   Wt 184 lb (83.5 kg)   LMP 11/24/2023 (Approximate)   SpO2 100%   BMI 35.23 kg/m²   GENERAL: well developed, well nourished,in no apparent distress  SKIN: no rashes,no suspicious lesions  HEAD: atraumatic, normocephalic.  Denies tenderness on palpation of maxillary/frontal sinuses  EYES: conjunctiva clear, EOM intact, +watering  EARS: TM's pearly/gray, no bulging, no retraction, no fluid, bony landmarks present  NOSE: Nostrils patent, clear nasal discharge, nasal mucosa pink.   THROAT: Oral mucosa pink, moist. Posterior pharynx is pink. +Cobble stoning.  erythematous. No exudates. Tonsils 1/4.    NECK: Supple, non-tender  LUNGS: clear to auscultation bilaterally, no wheezes or rhonchi. Breathing is non labored.  CARDIO: RRR without murmur  EXTREMITIES: no cyanosis, clubbing or edema  LYMPH:  No head or neck lymphadenopathy.        ASSESSMENT AND PLAN:   Mandi Martinez is a 38 year old female who presents with upper respiratory symptoms that are consistent with    ASSESSMENT:   Encounter Diagnoses   Name Primary?    Acute pharyngitis, unspecified etiology Yes    Viral conjunctivitis of right eye      Results for orders placed or performed in visit on 03/17/24   Strep A Assay W/Optic    Collection Time: 03/17/24  9:05 AM   Result Value Ref Range    Strep Grp A Screen Negative Negative    Control Line Present with a clear background (yes/no) Yes Yes/No    Kit Lot # [id number] Numeric    Kit Expiration Date 5/21/25 Date   COVID-19 LAB TEST NON-CDC    Collection  Time: 03/17/24  9:06 AM    Specimen: Nares   Result Value Ref Range    Rapid SARS-CoV-2 by PCR Not Detected Not Detected    POCT Lot Number [id number]     POCT Expiration Date 8/28/25     POCT Procedure Control Control Valid Control Valid     ,201            PLAN:     Comfort care per pt instructions.   To follow up for any new or worsening symptoms.   To go to the ER for any severe symptoms such as difficulty breathing or chest pain.     Meds & Refills for this Visit:  Requested Prescriptions      No prescriptions requested or ordered in this encounter       Risks, benefits, and side effects of medication explained and discussed.    Patient Instructions   Tylenol/Ibuprofen with pain/fever.  May gargle with warm salt water 2 times per day for at least 3 days.  Negative Rapid Strep in clinic. Results discussed. Will send out a throat culture to verify results.  Take medication as prescribed.  Do not share utensils or drinks with anyone.  Wash hands or use alcohol-based hand .  Patient/Family understands and agrees with plan.    The patient indicates understanding of these issues and agrees to the plan.  The patient is asked to return if sx's persist or worsen.

## 2024-03-20 ENCOUNTER — PATIENT MESSAGE (OUTPATIENT)
Facility: CLINIC | Age: 39
End: 2024-03-20

## 2024-03-20 NOTE — TELEPHONE ENCOUNTER
Antibiotics are not indicated for viral infections, but if pain has persisted despite OTC treatments recommend a visit. 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

## 2024-03-20 NOTE — TELEPHONE ENCOUNTER
From: Mandi Martinez  To: Vivienne Manning  Sent: 3/20/2024 9:15 AM CDT  Subject: Regarding antibiotics    Hi doctor alyssia, I’ve been sick with a sore throat since Wednesday. On Friday it was there but very little. On Saturday night my pain was horrible. I went to urgent care Sunday morning, but she said it was viral and it will run its course. But I’m not feeling any better my throat hurts at night more. And wanted antibiotics to start feeling better or to help the process. Thank you!

## 2024-03-20 NOTE — TELEPHONE ENCOUNTER
Patient notified, verbalized understanding. Unable to come in today       Future Appointments   Date Time Provider Department Center   3/21/2024  6:30 PM Rossy Chowdary MD [facility]

## 2024-06-01 ENCOUNTER — LAB ENCOUNTER (OUTPATIENT)
Dept: LAB | Facility: HOSPITAL | Age: 39
End: 2024-06-01
Attending: FAMILY MEDICINE
Payer: COMMERCIAL

## 2024-06-01 ENCOUNTER — OFFICE VISIT (OUTPATIENT)
Facility: CLINIC | Age: 39
End: 2024-06-01
Payer: COMMERCIAL

## 2024-06-01 VITALS
HEART RATE: 74 BPM | SYSTOLIC BLOOD PRESSURE: 118 MMHG | HEIGHT: 60.5 IN | WEIGHT: 186 LBS | BODY MASS INDEX: 35.58 KG/M2 | OXYGEN SATURATION: 99 % | DIASTOLIC BLOOD PRESSURE: 84 MMHG

## 2024-06-01 DIAGNOSIS — D50.0 IRON DEFICIENCY ANEMIA DUE TO CHRONIC BLOOD LOSS: ICD-10-CM

## 2024-06-01 DIAGNOSIS — N93.8 DYSFUNCTIONAL UTERINE BLEEDING: ICD-10-CM

## 2024-06-01 DIAGNOSIS — N93.8 DYSFUNCTIONAL UTERINE BLEEDING: Primary | ICD-10-CM

## 2024-06-01 LAB
BASOPHILS # BLD AUTO: 0.05 X10(3) UL (ref 0–0.2)
BASOPHILS NFR BLD AUTO: 0.6 %
DEPRECATED HBV CORE AB SER IA-ACNC: 4.4 NG/ML
DEPRECATED RDW RBC AUTO: 46.9 FL (ref 35.1–46.3)
EOSINOPHIL # BLD AUTO: 0.32 X10(3) UL (ref 0–0.7)
EOSINOPHIL NFR BLD AUTO: 3.9 %
ERYTHROCYTE [DISTWIDTH] IN BLOOD BY AUTOMATED COUNT: 15.2 % (ref 11–15)
HCT VFR BLD AUTO: 34.8 %
HGB BLD-MCNC: 10.6 G/DL
IMM GRANULOCYTES # BLD AUTO: 0.02 X10(3) UL (ref 0–1)
IMM GRANULOCYTES NFR BLD: 0.2 %
IRON SATN MFR SERPL: 9 %
IRON SERPL-MCNC: 40 UG/DL
LYMPHOCYTES # BLD AUTO: 2.09 X10(3) UL (ref 1–4)
LYMPHOCYTES NFR BLD AUTO: 25.6 %
MCH RBC QN AUTO: 25.7 PG (ref 26–34)
MCHC RBC AUTO-ENTMCNC: 30.5 G/DL (ref 31–37)
MCV RBC AUTO: 84.5 FL
MONOCYTES # BLD AUTO: 0.55 X10(3) UL (ref 0.1–1)
MONOCYTES NFR BLD AUTO: 6.7 %
NEUTROPHILS # BLD AUTO: 5.14 X10 (3) UL (ref 1.5–7.7)
NEUTROPHILS # BLD AUTO: 5.14 X10(3) UL (ref 1.5–7.7)
NEUTROPHILS NFR BLD AUTO: 63 %
PLATELET # BLD AUTO: 233 10(3)UL (ref 150–450)
PLATELETS.RETICULATED NFR BLD AUTO: 15.9 % (ref 0–7)
RBC # BLD AUTO: 4.12 X10(6)UL
TIBC SERPL-MCNC: 453 UG/DL (ref 250–425)
TRANSFERRIN SERPL-MCNC: 304 MG/DL (ref 250–380)
TSI SER-ACNC: 0.81 MIU/ML (ref 0.55–4.78)
WBC # BLD AUTO: 8.2 X10(3) UL (ref 4–11)

## 2024-06-01 PROCEDURE — 99214 OFFICE O/P EST MOD 30 MIN: CPT | Performed by: FAMILY MEDICINE

## 2024-06-01 PROCEDURE — 3079F DIAST BP 80-89 MM HG: CPT | Performed by: FAMILY MEDICINE

## 2024-06-01 PROCEDURE — 84443 ASSAY THYROID STIM HORMONE: CPT

## 2024-06-01 PROCEDURE — 84466 ASSAY OF TRANSFERRIN: CPT

## 2024-06-01 PROCEDURE — 82728 ASSAY OF FERRITIN: CPT

## 2024-06-01 PROCEDURE — 83540 ASSAY OF IRON: CPT

## 2024-06-01 PROCEDURE — 36415 COLL VENOUS BLD VENIPUNCTURE: CPT

## 2024-06-01 PROCEDURE — 3008F BODY MASS INDEX DOCD: CPT | Performed by: FAMILY MEDICINE

## 2024-06-01 PROCEDURE — 3074F SYST BP LT 130 MM HG: CPT | Performed by: FAMILY MEDICINE

## 2024-06-01 PROCEDURE — 85025 COMPLETE CBC W/AUTO DIFF WBC: CPT

## 2024-06-01 RX ORDER — MEDROXYPROGESTERONE ACETATE 10 MG/1
10 TABLET ORAL DAILY
Qty: 10 TABLET | Refills: 0 | Status: SHIPPED | OUTPATIENT
Start: 2024-06-01 | End: 2024-06-11

## 2024-06-01 NOTE — PROGRESS NOTES
CC:    Chief Complaint   Patient presents with    Menstrual Problem     In the month of may she got her period May 15 and May 30 now it has been heavy since this one that started May 30.       HPI: 39 year old female here with dysfunctional uterine bleeding.  She has had regular, monthly cycles for a long time, but during the month of May she got her menstrual cycle twice, and the current cycle is very heavy.  Started bleeding on Thursday, and she is passing clots and changing her tampon every 1-2 hours on average.  Still passing clots today.   She has been feeling dizzy again as well and not sleeping well at night.   She has been taking Slow Fe iron once a day.   Denies any shortness of breath, but she is very fatigued.   Has never had bleeding like this in the past.   Her sister does have fibroids.     ROS:  General:  No fever, fatigue, no weight changes  HEENT:  Denies congestion or nasal discharge  Breasts: Bilateral breast tenderness   Cardio:  No chest pain  Pulmonary:  No cough, no SOB  GI:  No N/V/D, no abdominal pain   :  No discharge, no dysuria, no polyuria, no hematuria, heavy and frequent menstrual bleeding   Dermatologic:  No rashes  Neuro: dizziness, no headaches     Past Medical History:    Ectopic pregnancy (HCC)    PONV (postoperative nausea and vomiting)       Social History     Socioeconomic History    Marital status:      Spouse name: Not on file    Number of children: Not on file    Years of education: Not on file    Highest education level: Not on file   Occupational History    Not on file   Tobacco Use    Smoking status: Never    Smokeless tobacco: Never   Vaping Use    Vaping status: Never Used   Substance and Sexual Activity    Alcohol use: Never    Drug use: Never    Sexual activity: Not on file   Other Topics Concern    Caffeine Concern No    Exercise No    Seat Belt No    Special Diet No    Stress Concern No    Weight Concern No   Social History Narrative    Not on file      Social Determinants of Health     Financial Resource Strain: Not on file   Food Insecurity: Not on file   Transportation Needs: Not on file   Physical Activity: Not on file   Stress: Not on file   Social Connections: Not on file   Housing Stability: Not on file       Current Outpatient Medications   Medication Sig Dispense Refill    Ferrous Sulfate  (45 Fe) MG Oral Tab CR Take 1 tablet (142 mg total) by mouth daily.         Patient has no known allergies.      Vitals:   Vitals:    06/01/24 1041   BP: 118/84   Pulse: 74   SpO2: 99%   Weight: 186 lb (84.4 kg)   Height: 5' 0.5\" (1.537 m)       Body mass index is 35.73 kg/m².    Physical:  General:  Alert, appropriate, no acute distress   HEENT: supple, no lymphadenopathy   Cardio:  RRR, no murmurs, S1, S2  Pulmonary:  Clear bilaterally, good air entry  GI:  Soft, positive bowel sounds, non-tender, no masses, no guarding, no rebound    Assessment and Plan: 39 year old female here for evaluation of dysfunctional uterine bleeding.     1. Dysfunctional uterine bleeding    - Check TSH, iron studies, and pelvic ultrasound for further evaluation  - Also to start Provera 10 mg daily x 5-10 to stabilize uterine lining and slow down or stop bleeding due to dizziness and fatigue reported with heavy bleeding  - Further plan pending results and response to Provera   - TSH W Reflex To Free T4 [E]; Future  - US PELVIS W EV (CPT=76856/38948); Future    2. Iron deficiency anemia due to chronic blood loss    - Check CBC and iron studies, and increase Slow Fe iron to twice daily while bleeding heavily   - CBC W Differential W Platelet [E]; Future  - Iron And Tibc [E]; Future  - Ferritin [E]; Future      Vivienne Manning DO  06/01/24  10:57 AM

## 2024-06-17 ENCOUNTER — HOSPITAL ENCOUNTER (OUTPATIENT)
Dept: ULTRASOUND IMAGING | Age: 39
Discharge: HOME | End: 2024-06-17
Attending: FAMILY MEDICINE

## 2024-06-17 DIAGNOSIS — N93.8 DYSFUNCTIONAL UTERINE BLEEDING: ICD-10-CM

## 2024-06-17 PROBLEM — D21.9 FIBROIDS: Status: ACTIVE | Noted: 2024-06-17

## 2024-06-17 PROCEDURE — 76830 TRANSVAGINAL US NON-OB: CPT | Performed by: FAMILY MEDICINE

## 2024-06-17 PROCEDURE — 76856 US EXAM PELVIC COMPLETE: CPT | Performed by: FAMILY MEDICINE

## 2024-09-18 ENCOUNTER — OFFICE VISIT (OUTPATIENT)
Dept: FAMILY MEDICINE CLINIC | Facility: CLINIC | Age: 39
End: 2024-09-18
Payer: COMMERCIAL

## 2024-09-18 VITALS
WEIGHT: 180 LBS | SYSTOLIC BLOOD PRESSURE: 110 MMHG | BODY MASS INDEX: 35 KG/M2 | HEART RATE: 80 BPM | RESPIRATION RATE: 18 BRPM | TEMPERATURE: 98 F | OXYGEN SATURATION: 99 % | DIASTOLIC BLOOD PRESSURE: 66 MMHG

## 2024-09-18 DIAGNOSIS — J02.9 SORE THROAT: ICD-10-CM

## 2024-09-18 DIAGNOSIS — J02.0 STREP THROAT: Primary | ICD-10-CM

## 2024-09-18 LAB
CONTROL LINE PRESENT WITH A CLEAR BACKGROUND (YES/NO): YES YES/NO
STREP GRP A CUL-SCR: POSITIVE

## 2024-09-18 PROCEDURE — 99213 OFFICE O/P EST LOW 20 MIN: CPT | Performed by: NURSE PRACTITIONER

## 2024-09-18 PROCEDURE — 3074F SYST BP LT 130 MM HG: CPT | Performed by: NURSE PRACTITIONER

## 2024-09-18 PROCEDURE — 87880 STREP A ASSAY W/OPTIC: CPT | Performed by: NURSE PRACTITIONER

## 2024-09-18 PROCEDURE — 3078F DIAST BP <80 MM HG: CPT | Performed by: NURSE PRACTITIONER

## 2024-09-18 RX ORDER — AMOXICILLIN 500 MG/1
500 CAPSULE ORAL 2 TIMES DAILY
Qty: 20 CAPSULE | Refills: 0 | Status: SHIPPED | OUTPATIENT
Start: 2024-09-18 | End: 2024-09-28

## 2024-09-18 NOTE — PROGRESS NOTES
CHIEF COMPLAINT:     Chief Complaint   Patient presents with    Sore Throat     Entered by patient sore throat, headache and ear pain on and off since Sunday       HPI:   Mandi Martinez is a 39 year old female presents to clinic with symptoms of sore throat. Patient has had for 4 days. Symptoms have worsening since onset.  Patient reports following associated symptoms: sore throat, headache, ear pain.   Denies: congestion.  Treating symptoms with: none    Current Outpatient Medications   Medication Sig Dispense Refill    amoxicillin 500 MG Oral Cap Take 1 capsule (500 mg total) by mouth 2 (two) times daily for 10 days. 20 capsule 0    Levonorgestrel-Ethinyl Estrad (LUTERA) 0.1-20 MG-MCG Oral Tab Take 1 tablet by mouth daily. 90 tablet 0    Ferrous Sulfate  (45 Fe) MG Oral Tab CR Take 1 tablet (142 mg total) by mouth daily. (Patient not taking: Reported on 9/18/2024)        Past Medical History:    Ectopic pregnancy (HCC)    PONV (postoperative nausea and vomiting)      Social History:  Social History     Socioeconomic History    Marital status:    Tobacco Use    Smoking status: Never    Smokeless tobacco: Never   Vaping Use    Vaping status: Never Used   Substance and Sexual Activity    Alcohol use: Never    Drug use: Never   Other Topics Concern    Caffeine Concern No    Exercise No    Seat Belt No    Special Diet No    Stress Concern No    Weight Concern No        REVIEW OF SYSTEMS:   GENERAL HEALTH:  See HPI  SKIN: denies any unusual skin lesions or rashes  HEENT: denies ear pain, See HPI  RESPIRATORY: denies shortness of breath, or wheezing  CARDIOVASCULAR: denies chest pain, palpitations   GI: denies abdominal pain, constipation and diarrhea  NEURO: denies dizziness or lightheadedness    EXAM:   /66   Pulse 80   Temp 98.4 °F (36.9 °C) (Oral)   Resp 18   Wt 180 lb (81.6 kg)   LMP 09/13/2024 (Approximate)   SpO2 99%   BMI 34.58 kg/m²   GENERAL: well developed, well nourished,in no  apparent distress  SKIN: no rashes,no suspicious lesions  HEAD: atraumatic, normocephalic  EYES: conjunctiva clear, EOM intact  EARS: TM's clear, non-injected, no bulging, retraction, or fluid  NOSE: nostrils patent, no exudates, nasal mucosa pink and noninflamed  THROAT: oral mucosa pink, moist. Posterior pharynx erythematous and injected. + exudates. Tonsils 2/4. No trismus, hoarseness, muffled voice, stridor, or uvular deviation.    NECK: supple, non-tender  LUNGS: clear to auscultation bilaterally; no wheezes, rales, or rhonchi. Breathing is non labored.  CARDIO: RRR without murmur  EXTREMITIES: no cyanosis, clubbing or edema  LYMPH: Positive anterior cervical lymphadenopathy.  No posterior cervical or occipital lymphadenopathy.    Recent Results (from the past 24 hour(s))   Strep A Assay W/Optic    Collection Time: 09/18/24  3:29 PM   Result Value Ref Range    Strep Grp A Screen Positive Negative    Control Line Present with a clear background (yes/no) Yes Yes/No    Kit Lot # [id number] Numeric    Kit Expiration Date 5/21/25 Date       ASSESSMENT AND PLAN:   Assessment:   Encounter Diagnoses   Name Primary?    Sore throat     Strep throat Yes     Rapid strep screen is positive.  1. Sore throat  - Strep A Assay W/Optic    2. Strep throat  - amoxicillin 500 MG Oral Cap; Take 1 capsule (500 mg total) by mouth 2 (two) times daily for 10 days.  Dispense: 20 capsule; Refill: 0    Plan:  Meds and instructions as listed below. Risks, benefits, complications and side effects of meds discussed with patient.   OTC Tylenol/Motrin prn. Push fluids; warm salt water gargles. Change tooth brush two days into therapy.    Follow up with PCP in 3-5 days if not improving, condition worsens, or fever greater than or equal to 100.4 persists for 72 hours.      Meds & Refills for this Visit:  Requested Prescriptions     Signed Prescriptions Disp Refills    amoxicillin 500 MG Oral Cap 20 capsule 0     Sig: Take 1 capsule (500 mg total)  by mouth 2 (two) times daily for 10 days.       See pt handout  The patient/parent indicates understanding of these issues and agrees to the plan.

## 2024-09-30 ENCOUNTER — HOSPITAL ENCOUNTER (OUTPATIENT)
Dept: MAMMOGRAPHY | Facility: HOSPITAL | Age: 39
Discharge: HOME | End: 2024-09-30
Attending: FAMILY MEDICINE
Payer: COMMERCIAL

## 2024-09-30 DIAGNOSIS — R92.8 ABNORMAL MAMMOGRAM: ICD-10-CM

## 2024-09-30 PROCEDURE — 77066 DX MAMMO INCL CAD BI: CPT | Performed by: FAMILY MEDICINE

## 2024-09-30 PROCEDURE — 77062 BREAST TOMOSYNTHESIS BI: CPT | Performed by: FAMILY MEDICINE

## 2024-10-21 ENCOUNTER — OFFICE VISIT (OUTPATIENT)
Dept: FAMILY MEDICINE CLINIC | Facility: CLINIC | Age: 39
End: 2024-10-21
Payer: COMMERCIAL

## 2024-10-21 VITALS
WEIGHT: 175 LBS | RESPIRATION RATE: 16 BRPM | TEMPERATURE: 98 F | HEART RATE: 71 BPM | BODY MASS INDEX: 34.36 KG/M2 | SYSTOLIC BLOOD PRESSURE: 126 MMHG | OXYGEN SATURATION: 100 % | DIASTOLIC BLOOD PRESSURE: 84 MMHG | HEIGHT: 60 IN

## 2024-10-21 DIAGNOSIS — J01.90 ACUTE SINUSITIS WITH SYMPTOMS > 10 DAYS: Primary | ICD-10-CM

## 2024-10-21 PROCEDURE — 3079F DIAST BP 80-89 MM HG: CPT | Performed by: NURSE PRACTITIONER

## 2024-10-21 PROCEDURE — 99213 OFFICE O/P EST LOW 20 MIN: CPT | Performed by: NURSE PRACTITIONER

## 2024-10-21 PROCEDURE — 3074F SYST BP LT 130 MM HG: CPT | Performed by: NURSE PRACTITIONER

## 2024-10-21 PROCEDURE — 3008F BODY MASS INDEX DOCD: CPT | Performed by: NURSE PRACTITIONER

## 2024-10-21 NOTE — PROGRESS NOTES
aMndi Martinez is a 39 year old female.   Chief Complaint   Patient presents with    Cough     Sinus pressure, headache - Entered by patient  3 weeks of sinus problems, cough started Friday        HPI:    Symptoms started 3 weeks ago with purulent nasal discharge, maxillary sinus pressure, and headache, a lot of  post-nasal drip. Worsening.    Allergies:  Allergies[1]       Current Outpatient Medications   Medication Sig Dispense Refill    amoxicillin clavulanate 875-125 MG Oral Tab Take 1 tablet by mouth 2 (two) times daily for 10 days. 20 tablet 0    Levonorgestrel-Ethinyl Estrad (LUTERA) 0.1-20 MG-MCG Oral Tab Take 1 tablet by mouth daily. 90 tablet 0    Ferrous Sulfate  (45 Fe) MG Oral Tab CR Take 1 tablet (142 mg total) by mouth daily. (Patient not taking: Reported on 2024)        Past Medical History:    Ectopic pregnancy (HCC)    PONV (postoperative nausea and vomiting)      Past Surgical History:   Procedure Laterality Date          Laparoscopy,pelvic,biopsy Right 2020    robotic ovarian cystectomy and lyis of adhesions    Needle biopsy left Left 2023    US bx    Other surgical history      left salpingectomy            ROS:   GENERAL HEALTH: otherwise feels well  EYES: no visual complaints or deficits  RESPIRATORY: no shortness of breath, wheezing   CARDIOVASCULAR: no chest pain or SOB.  GI: denies nausea, vomiting.  PSYCHE: no symptoms of depression or anxiety      PE:  /84   Pulse 71   Temp 97.8 °F (36.6 °C) (Temporal)   Resp 16   Ht 5' (1.524 m)   Wt 175 lb (79.4 kg)   LMP 2024 (Approximate)   SpO2 100%   BMI 34.18 kg/m²   General: WD/WN in no acute distress.   Eyes & ears: conjunctiva clear, sclera white; TM’s non-bulging without erythema.   Sinuses maxillary: tender to percussion.   Nares: red mucosa and thick yellow discharge, no septal deviations.   Mouth: moist with mild erythema, no exudates, and + PND.   Neck: Supple with no cervical  LAD.   Lungs: Clear to auscultation bilaterally; no wheeze, rhonchi, or rales.    Heart: S1/S2 regular no murmurs.      ASSESSMENT/ PLAN:     Encounter Diagnosis   Name Primary?    Acute sinusitis with symptoms > 10 days Yes       Meds & Refills for this Visit:  Requested Prescriptions     Signed Prescriptions Disp Refills    amoxicillin clavulanate 875-125 MG Oral Tab 20 tablet 0     Sig: Take 1 tablet by mouth 2 (two) times daily for 10 days.       Imaging & Consults:  None      Increase fluids, rest, Tylenol or Ibuprofen prn, f/u in 5 days if not better.               [1] No Known Allergies

## 2024-12-07 ENCOUNTER — OFFICE VISIT (OUTPATIENT)
Dept: FAMILY MEDICINE CLINIC | Facility: CLINIC | Age: 39
End: 2024-12-07
Payer: COMMERCIAL

## 2024-12-07 VITALS
RESPIRATION RATE: 18 BRPM | HEART RATE: 79 BPM | TEMPERATURE: 99 F | SYSTOLIC BLOOD PRESSURE: 124 MMHG | OXYGEN SATURATION: 98 % | BODY MASS INDEX: 35 KG/M2 | WEIGHT: 177 LBS | DIASTOLIC BLOOD PRESSURE: 86 MMHG

## 2024-12-07 DIAGNOSIS — J98.01 BRONCHOSPASM: Primary | ICD-10-CM

## 2024-12-07 PROCEDURE — 3079F DIAST BP 80-89 MM HG: CPT | Performed by: NURSE PRACTITIONER

## 2024-12-07 PROCEDURE — 99213 OFFICE O/P EST LOW 20 MIN: CPT | Performed by: NURSE PRACTITIONER

## 2024-12-07 PROCEDURE — 3074F SYST BP LT 130 MM HG: CPT | Performed by: NURSE PRACTITIONER

## 2024-12-07 RX ORDER — ALBUTEROL SULFATE 90 UG/1
INHALANT RESPIRATORY (INHALATION)
Qty: 1 EACH | Refills: 0 | Status: SHIPPED | OUTPATIENT
Start: 2024-12-07

## 2024-12-08 NOTE — PROGRESS NOTES
CHIEF COMPLAINT:     Chief Complaint   Patient presents with    Cough     Entered by patient, sick for 12 days, sinus congestion         HPI:   Mandi Martinez is a 39 year old female who presents for cough for  12  days.  Cough started gradually and is described as tight and deep.   Cough is non productive and is worse at night. Other triggers for the cough: Talking and deep breathing. Home treatments include: OTC cough and cold medications.      The patientis not a smoker.        Current Outpatient Medications   Medication Sig Dispense Refill    albuterol 108 (90 Base) MCG/ACT Inhalation Aero Soln 2 puffs 4 times per day for 1 week, then as needed for 1 week. 1 each 0      Past Medical History:    Ectopic pregnancy (HCC)    PONV (postoperative nausea and vomiting)      Social History:  Social History     Socioeconomic History    Marital status:    Tobacco Use    Smoking status: Never    Smokeless tobacco: Never   Vaping Use    Vaping status: Never Used   Substance and Sexual Activity    Alcohol use: Never    Drug use: Never   Other Topics Concern    Caffeine Concern No    Exercise No    Seat Belt No    Special Diet No    Stress Concern No    Weight Concern No        REVIEW OF SYSTEMS:   GENERAL: No fever or chills.  SKIN: No rashes, or other skin lesions.   EYES: Denies blurred vision or double vision.  HENT: Denies ear pain, decreased hearing, or sore throat.  Reports mild sinus congestion.  CARDIOVASCULAR: Denies chest pain or palpitations  LUNGS: Per HPI. Denies shortness of breath with exertion or rest.   GI: Denies N/V/C/D or abdominal pain.      EXAM:   /86   Pulse 79   Temp 98.5 °F (36.9 °C) (Oral)   Resp 18   Wt 177 lb (80.3 kg)   LMP 12/05/2024 (Approximate)   SpO2 98%   BMI 34.57 kg/m²   GENERAL: well developed, well nourished,in no apparent distress.  Not toxic appearing.  No labored breathing  SKIN: no rashes, no suspicious lesions  EYES: Conjunctiva clear.  No scleral  icterus.  HENT: Atraumatic, normocephalic.  TM's clear bilaterally.  Nostrils patent, nasal mucosa pink and non-inflamed.  No erythema of the throat.  NECK: supple, non-tender.  LUNGS: Normal respiratory rate. Normal effort.  Dry cough. No rales or crackles. No dullness on percussion of anterior and posterior chest wall. No decreased BS.    CARDIO: RRR without murmur  LYMPH: No cervical or supraclavicular lymphadenopathy.   EXTREMITIES:  No clubbing, cyanosis, or edema.    ASSESSMENT AND PLAN:   Mandi Martinez is a 39 year old female who presents with: 12 day hx of cough, + Post-tussive emesis,and sinus congestion.No fever since illness began.Mild sinus congestion.Will treat bronchospasm with Albuterol.    ASSESSMENT:  Encounter Diagnosis   Name Primary?    Bronchospasm Yes       PLAN:  Albuterol Inhaleras below.  Bronchitis is most often a viral illness that does not respond to antibiotic treatment.  The symptoms of bronchitis (cough, mucous congestion, fatigue) may last three weeks or more.  Cough may last longer than the other symptoms.  Treatment goals include symptomatic care and helping you breathe easier.      1. It is important to rest and drink plenty of fluids.    2. Use OTC lozenges as needed.    4. Tylenol and Motrin OTC as needed for any pain or fever  5. For congestion try saline nasal spray OTC twice daily  6. Follow up with primary care physician within 1 week  7. Seek medical attention sooner for worsening of symptoms despite treatment efforts, or the emergency room for the following non inclusive list of symptoms: uncontrolled fever/pain, inability to keep fluids down, shortness of breath or respiratory distress.      Meds & Refills for this Visit:  Requested Prescriptions     Signed Prescriptions Disp Refills    albuterol 108 (90 Base) MCG/ACT Inhalation Aero Soln 1 each 0     Si puffs 4 times per day for 1 week, then as needed for 1 week.           The patient indicates understanding of  these issues and agrees to the plan.  The patient is asked to return if sx's persist or worsen.  Go to ER for significant shortness of breath, persistent fever.

## 2025-02-07 ENCOUNTER — LAB ENCOUNTER (OUTPATIENT)
Dept: LAB | Age: 40
End: 2025-02-07
Attending: FAMILY MEDICINE
Payer: COMMERCIAL

## 2025-02-07 ENCOUNTER — OFFICE VISIT (OUTPATIENT)
Facility: CLINIC | Age: 40
End: 2025-02-07
Payer: COMMERCIAL

## 2025-02-07 VITALS
HEART RATE: 92 BPM | DIASTOLIC BLOOD PRESSURE: 88 MMHG | BODY MASS INDEX: 34.17 KG/M2 | SYSTOLIC BLOOD PRESSURE: 122 MMHG | HEIGHT: 61 IN | WEIGHT: 181 LBS | OXYGEN SATURATION: 98 %

## 2025-02-07 DIAGNOSIS — R10.84 GENERALIZED ABDOMINAL PAIN: ICD-10-CM

## 2025-02-07 DIAGNOSIS — R41.840 ATTENTION DEFICIT: ICD-10-CM

## 2025-02-07 DIAGNOSIS — R10.84 GENERALIZED ABDOMINAL PAIN: Primary | ICD-10-CM

## 2025-02-07 LAB
ALBUMIN SERPL-MCNC: 4.5 G/DL (ref 3.2–4.8)
ALBUMIN/GLOB SERPL: 1.8 {RATIO} (ref 1–2)
ALP LIVER SERPL-CCNC: 43 U/L
ALT SERPL-CCNC: 18 U/L
ANION GAP SERPL CALC-SCNC: 6 MMOL/L (ref 0–18)
AST SERPL-CCNC: 18 U/L (ref ?–34)
BILIRUB SERPL-MCNC: 0.3 MG/DL (ref 0.3–1.2)
BUN BLD-MCNC: 10 MG/DL (ref 9–23)
BUN/CREAT SERPL: 18.2 (ref 10–20)
CALCIUM BLD-MCNC: 8.9 MG/DL (ref 8.7–10.4)
CHLORIDE SERPL-SCNC: 106 MMOL/L (ref 98–112)
CO2 SERPL-SCNC: 27 MMOL/L (ref 21–32)
CREAT BLD-MCNC: 0.55 MG/DL
EGFRCR SERPLBLD CKD-EPI 2021: 120 ML/MIN/1.73M2 (ref 60–?)
FASTING STATUS PATIENT QL REPORTED: YES
GLOBULIN PLAS-MCNC: 2.5 G/DL (ref 2–3.5)
GLUCOSE BLD-MCNC: 86 MG/DL (ref 70–99)
LIPASE SERPL-CCNC: 65 U/L (ref 12–53)
OSMOLALITY SERPL CALC.SUM OF ELEC: 286 MOSM/KG (ref 275–295)
POTASSIUM SERPL-SCNC: 3.9 MMOL/L (ref 3.5–5.1)
PROT SERPL-MCNC: 7 G/DL (ref 5.7–8.2)
SODIUM SERPL-SCNC: 139 MMOL/L (ref 136–145)

## 2025-02-07 PROCEDURE — 83013 H PYLORI (C-13) BREATH: CPT

## 2025-02-07 PROCEDURE — 99213 OFFICE O/P EST LOW 20 MIN: CPT | Performed by: FAMILY MEDICINE

## 2025-02-07 PROCEDURE — 36415 COLL VENOUS BLD VENIPUNCTURE: CPT

## 2025-02-07 PROCEDURE — 80053 COMPREHEN METABOLIC PANEL: CPT

## 2025-02-07 PROCEDURE — 83690 ASSAY OF LIPASE: CPT

## 2025-02-07 NOTE — PROGRESS NOTES
Subjective:   Mandi Martinez is a 39 year old female who presents for Stomach Pain     Patient presents for stomach pain only in the evening. Has discussed with Dr Manning before who recommended food diary to see triggers. Patient did investigate and really no trigger foods. Has eliminated spicy foods. Patient did have an episode of severe pain at work one time and coworker gave her antacid and patient felt immediate relief. Has never been tested for hpylori. No hematochezia. No melena.     Patient is also wondering if potentially has ADHD. Finds mind will wander. Difficult to concentrate. Is interested in testing because is interested in going back to school    History/Other:    Chief Complaint Reviewed and Verified  No Further Nursing Notes to   Review  Tobacco Reviewed  Allergies Reviewed  Medications Reviewed    Problem List Reviewed  Medical History Reviewed  Surgical History   Reviewed  OB Status Reviewed  Family History Reviewed  Social History   Reviewed         Tobacco:  She has never smoked tobacco.    Current Outpatient Medications   Medication Sig Dispense Refill    albuterol 108 (90 Base) MCG/ACT Inhalation Aero Soln 2 puffs 4 times per day for 1 week, then as needed for 1 week. (Patient not taking: Reported on 2/7/2025) 1 each 0         Review of Systems:  Review of Systems   Constitutional: Negative.    Respiratory: Negative.     Cardiovascular: Negative.    Gastrointestinal:  Positive for abdominal pain.   Skin: Negative.    Neurological: Negative.    Psychiatric/Behavioral:  Positive for decreased concentration.        Objective:   /88   Pulse 92   Ht 5' 1\" (1.549 m)   Wt 181 lb (82.1 kg)   LMP 01/07/2025 (Approximate)   SpO2 98%   BMI 34.20 kg/m²  Estimated body mass index is 34.2 kg/m² as calculated from the following:    Height as of this encounter: 5' 1\" (1.549 m).    Weight as of this encounter: 181 lb (82.1 kg).  Physical Exam  Vitals and nursing note reviewed.    Constitutional:       General: She is not in acute distress.     Appearance: Normal appearance.   HENT:      Head: Normocephalic and atraumatic.   Eyes:      General:         Right eye: No discharge.         Left eye: No discharge.      Comments: Extraocular eye movements grossly intact   Pulmonary:      Effort: Pulmonary effort is normal.   Abdominal:      General: Abdomen is flat. There is no distension.      Palpations: Abdomen is soft. There is no mass.      Tenderness: There is abdominal tenderness (epigastric). There is no guarding or rebound.      Hernia: No hernia is present.   Musculoskeletal:      Comments: Normal gait   Skin:     Findings: No rash.   Neurological:      General: No focal deficit present.      Mental Status: She is alert. Mental status is at baseline.   Psychiatric:         Mood and Affect: Mood normal.         Behavior: Behavior normal.         Assessment & Plan:   1. Generalized abdominal pain (Primary)  -     Helicobacter Pylori Breath Test; Future; Expected date: 02/07/2025  -     Comp Metabolic Panel (14); Future; Expected date: 02/07/2025  -     Lipase; Future; Expected date: 02/07/2025  -     Gastro Referral - In Network    -differential includes hpylori vs GERD vs other intraabdominal etiology. Ordered labs as listed to begin workup and referred to GI per patient request    2. Attention deficit  -     [facility]    -referred to [facility] for testing      Return if symptoms worsen or fail to improve.    Rossy Chowdary MD, 2/7/2025, 8:46 AM

## 2025-02-09 LAB — H PYLORI BREATH TEST: NEGATIVE

## 2025-02-12 ENCOUNTER — TELEPHONE (OUTPATIENT)
Age: 40
End: 2025-02-12

## 2025-02-12 NOTE — TELEPHONE ENCOUNTER
Hello,     The [facility] Navigation team has attempted to reach you regarding an order from Dr. Chowdary's office. We are reaching out in order to assist you in coordinating care and resources that may meet your needs. Please give our office a call at [phone]. For more immediate assistance you can contact our 24-hour help line at [phone]. We look forward to hearing from you soon.

## 2025-02-23 NOTE — H&P
[facility] - Gastroenterology                                                                                                               Reason for consult: eval    Requesting physician or provider: Vivienne Manning DO    Chief Complaint   Patient presents with    Consult     Abdominal pain       HPI:   Mandi Martinez is a 39 year old year-old female with history of ectopic pregnancy:    she is here today for evaluation gen abd pain    Sx started a few mos ago. Not getting better.  Onset w/o change in diet, activity, medication, other illness    Gen abd aching/discomfort.  Pain most noticeable at night  Does feel bloated  Feels like she gets rachel faster and is eating less  Weight up and down    Bm daily. No constipation. No diarrhea. No brbpr and/or melena.     Hgb 10.6 (2024) -stable from comparison    Ferritin, iron def 2024  Tsh normal  2024-  Hpylori neg  Lfts normal  Minor elevation in lipase    H/o iron def anemia  She denies heavy periods  Tries to take oral iron supplement when she remembers    NSAIDS: no  Tobacco: no  Alcohol: no  Marijuana: no  Illicit drugs: no    No FH GI malignancy, IBD, celiac    No history of adverse reaction to sedation  No CECILIO  No anticoagulants  No pacemaker/defibrillator  No pain medications and/or sleep aides      Last colonoscopy: no  Last EGD: no    Wt Readings from Last 6 Encounters:   25 181 lb (82.1 kg)   25 181 lb (82.1 kg)   24 177 lb (80.3 kg)   10/21/24 175 lb (79.4 kg)   24 180 lb (81.6 kg)   24 186 lb (84.4 kg)        History, Medications, Allergies, ROS:      Past Medical History:    Ectopic pregnancy (HCC)    PONV (postoperative nausea and vomiting)      Past Surgical History:   Procedure Laterality Date          Laparoscopy,pelvic,biopsy Right 2020    robotic ovarian cystectomy and lyis of adhesions    Needle  biopsy left Left 06/08/2023    US bx    Other surgical history  2008    left salpingectomy      Family Hx:   Family History   Problem Relation Age of Onset    Hypertension Mother     Diabetes Mother     Thyroid disease Mother     Hypertension Father     Diabetes Father       Social History:   Social History     Socioeconomic History    Marital status:    Tobacco Use    Smoking status: Never    Smokeless tobacco: Never   Vaping Use    Vaping status: Never Used   Substance and Sexual Activity    Alcohol use: Never    Drug use: Never   Other Topics Concern    Caffeine Concern No    Exercise No    Seat Belt No    Special Diet No    Stress Concern No    Weight Concern No        Medications (Active prior to today's visit):  Current Outpatient Medications   Medication Sig Dispense Refill    PEG 3350-KCl-Na Bicarb-NaCl (TRILYTE) 420 g Oral Recon Soln Take prep as directed by gastro office. May substitute with Trilyte/generic equivalent if needed. 4000 mL 0    albuterol 108 (90 Base) MCG/ACT Inhalation Aero Soln 2 puffs 4 times per day for 1 week, then as needed for 1 week. 1 each 0       Allergies:  Allergies[1]    ROS:   CONSTITUTIONAL: negative for fevers, chills, sweats and weight loss  EYES Negative for red eyes, yellow eyes, changes in vision  HEENT: Negative for dysphagia and hoarseness  RESPIRATORY: Negative for cough and shortness of breath  CARDIOVASCULAR: Negative for chest pain, palpitations  GASTROINTESTINAL: See HPI  GENITOURINARY: Negative for dysuria and frequency  MUSCULOSKELETAL: Negative for arthralgias and myalgias  NEUROLOGICAL: Negative for dizziness and headaches  BEHAVIOR/PSYCH: Negative for anxiety and poor appetite    PHYSICAL EXAM:   Blood pressure 125/83, height 5' 1\" (1.549 m), weight 181 lb (82.1 kg), last menstrual period 01/07/2025, not currently breastfeeding.    GEN: WD/WN, NAD  HEENT: Supple symmetrical, trachea midline  CV: RRR, the extremities are warm and well perfused   LUNGS: No  increased work of breathing  ABDOMEN: No scars, normal bowel sounds, soft, non-tender, non-distended no rebound or guarding, no masses, no hepatomegaly  MSK: No redness, no warmth, no swelling of joints  SKIN: No jaundice, no erythema, no rashes  HEMATOLOGIC: No bleeding, no bruising  NEURO: Alert and interactive, normal gait    Labs/Imaging/Procedures:     Patient's pertinent labs and imaging were reviewed and discussed with patient today.        .  ASSESSMENT/PLAN:   Mandi Martinez is a 39 year old year-old female with history of ectopic pregnancy:    #yulisa  #gen abd pain  #early satiety  #bloating  She is here today for eval of her c/o gen abd pain - sx described as aching. Are not improving over mos. Has had associated bloating, early satiety. Weight stable from overall trend. She denies constipation, reflux. Noted yulisa on lab testing. Ultrasound shows fibroids. Hpylori neg. No fhx gi malignancy, IBD, celiac. Has not had cln and/or egd. Plan as below.     -see gyne  -ct a/p to further characterize pain    1. Schedule colonoscopy/egd with MAC w/ General pool MD [Diagnosis: #yulisa  #gen abd pain  #early satiety]    2.  bowel prep from pharmacy (TV TubeXlyte)    3.   For cardiology patients and patients on blood thinners:  Please contact your cardiology clinic for clearance to proceed with the endoscopic procedure. If you are on blood thinners, please also confirm with your cardiologic clinic that you are able to hold the blood thinner per our recommendations.\"    BLOOD THINNER ORDERS:  -Hold for 48 hours (Xarelto, Eliquis, Pradaxa, Savaysa)  -Hold for 3 days (Pletal)  -Hold for 5 days (Coumadin, Plavix, Brilinta, Aggrenox)  -Hold for 7 days (Effient)     For endocrinology insulin patients:    Please contact your endocrinology clinic for insulin adjustment orders prior to your endoscopic procedure.    4. Read all bowel prep instructions carefully    5. AVOID seeds, nuts, popcorn, raw fruits and vegetables  (cooked is okay) for 2-3 days before procedure    6.   If you start any NEW medication after your visit today, please notify us. Certain medications will need to be held before the procedure, or the procedure cannot be performed.     >>>Please note: if you were prescribed Suprep for the bowel prep and it is too expensive or not covered by insurance, it is okay to substitute Trilyte (or any similar generic prep). This can be done by notifying the pharmacy or calling our office.      Orders This Visit:  No orders of the defined types were placed in this encounter.      Meds This Visit:  Requested Prescriptions     Signed Prescriptions Disp Refills    PEG 3350-KCl-Na Bicarb-NaCl (TRILYTE) 420 g Oral Recon Soln 4000 mL 0     Sig: Take prep as directed by gastro office. May substitute with Trilyte/generic equivalent if needed.       Imaging & Referrals:  CT ABDOMEN+PELVIS(CONTRAST ONLY)(CPT=74177)    ENDOSCOPIC RISK BENEFIT DISCUSSION: I described the procedure in great detail with the patient. I discussed the risks and benefits, including but not limited to: bleeding, perforation, infection, anesthesia complications, and even death. Patient will be NPO after midnight and will have a person physically present at time of pick-up to drive patient home. Patient verbalized understanding and agrees to proceed with procedure as planned.    Porsche Lutz, APRN   2/23/2025        This note was partially prepared using [employer] voice recognition dictation software. As a result, errors may occur. When identified, these errors have been corrected. While every attempt is made to correct errors during dictation, discrepancies may still exist.          [1] No Known Allergies

## 2025-02-27 ENCOUNTER — TELEPHONE (OUTPATIENT)
Facility: CLINIC | Age: 40
End: 2025-02-27

## 2025-02-27 ENCOUNTER — OFFICE VISIT (OUTPATIENT)
Dept: GASTROENTEROLOGY | Facility: CLINIC | Age: 40
End: 2025-02-27

## 2025-02-27 VITALS
DIASTOLIC BLOOD PRESSURE: 83 MMHG | HEIGHT: 61 IN | SYSTOLIC BLOOD PRESSURE: 125 MMHG | WEIGHT: 181 LBS | BODY MASS INDEX: 34.17 KG/M2

## 2025-02-27 DIAGNOSIS — R14.0 BLOATING: ICD-10-CM

## 2025-02-27 DIAGNOSIS — R68.81 EARLY SATIETY: ICD-10-CM

## 2025-02-27 DIAGNOSIS — D50.9 IRON DEFICIENCY ANEMIA, UNSPECIFIED IRON DEFICIENCY ANEMIA TYPE: Primary | ICD-10-CM

## 2025-02-27 DIAGNOSIS — R10.84 GENERALIZED ABDOMINAL PAIN: ICD-10-CM

## 2025-02-27 PROCEDURE — 3079F DIAST BP 80-89 MM HG: CPT | Performed by: NURSE PRACTITIONER

## 2025-02-27 PROCEDURE — 3074F SYST BP LT 130 MM HG: CPT | Performed by: NURSE PRACTITIONER

## 2025-02-27 PROCEDURE — 3008F BODY MASS INDEX DOCD: CPT | Performed by: NURSE PRACTITIONER

## 2025-02-27 PROCEDURE — 99204 OFFICE O/P NEW MOD 45 MIN: CPT | Performed by: NURSE PRACTITIONER

## 2025-02-27 RX ORDER — POLYETHYLENE GLYCOL 3350, SODIUM CHLORIDE, SODIUM BICARBONATE, POTASSIUM CHLORIDE 420; 11.2; 5.72; 1.48 G/4L; G/4L; G/4L; G/4L
POWDER, FOR SOLUTION ORAL
Qty: 4000 ML | Refills: 0 | Status: SHIPPED | OUTPATIENT
Start: 2025-02-27

## 2025-02-27 NOTE — TELEPHONE ENCOUNTER
Scheduled:  Colonoscopy/Esophagogastroduodenoscopy [id number]    Provider Name:   Miladgavinang    Date:  5/29/2025    Location:    [facility]    Sedation:  MAC    Time:  12:30 pm (Patient made aware EM will call the day before with procedure/arrival time )    Prep:  Trilyte    Meds/Allergies Reconciled?:  Physician reviewed     Diagnosis with codes:    Iron deficiency anemia, unspecified iron deficiency anemia type [D50.9]  Generalized abdominal pain [R10.84]  .Early satiety [R68.81]         Was patient informed to call insurance with codes (Y/N):  Yes, I confirmed [facility] insurance with the patient.     Referral sent?:  N/A    EM or [facility] notified?:  I sent an electronic request to Endo Scheduling and received a confirmation today.      Medication Orders:  This patient verbally confirmed that she is not taking:     blood thinners, BP meds, and is not diabetic    No latex allergy, No PCN allergy and does not have a pacemaker     Misc Orders:    Hold Iron 7 days prior to procedure       Further instructions given by staff:   I discussed the prep instructions with the patient which she verbally understood and is aware that I will send the instructions today via [employer].    Advised patient:    You will not be able to drive, operate machinery or make critical decisions the day of your procedure. Please make arrangements for transportation. You must have a  (age 18 or older) to accompany you, stay in the facility for the duration of your procedure and drive you home after the procedure.  You cannot use public transportation (Uber, Lyft, Taxi). The procedure involves sedation, and you will not be allowed to leave unaccompanied. Your procedure will not proceed forward if you're unable to confirm your  planned to escort you home.    Advised Patient:    [facility] requires payment of copay and any patient responsibility at the time of registration.   The [facility] requires copay and 50% of the patient responsibility at the time  of service for all Esophagogastroduodenoscopy and diagnostic Colonoscopies.     They do offer payment plans and Care Credit options if unable to pay the full amount at the time of registration.     If you have any questions regarding your potential responsibility, please contact [facility] Insurance Department at [phone] option 1.    You may receive 4 bills related to your medical procedure:   [facility] (the facility)  The procedural physician  The anesthesiologist  The pathology lab (if applicable)

## 2025-02-27 NOTE — PATIENT INSTRUCTIONS
-see gyne  -ct a/p to further characterize pain    1. Schedule colonoscopy/egd with MAC w/ General pool MD [Diagnosis: #yulisa  #gen abd pain  #early satiety]    2.  bowel prep from pharmacy (split trilyte)    3.   For cardiology patients and patients on blood thinners:  Please contact your cardiology clinic for clearance to proceed with the endoscopic procedure. If you are on blood thinners, please also confirm with your cardiologic clinic that you are able to hold the blood thinner per our recommendations.\"    BLOOD THINNER ORDERS:  -Hold for 48 hours (Xarelto, Eliquis, Pradaxa, Savaysa)  -Hold for 3 days (Pletal)  -Hold for 5 days (Coumadin, Plavix, Brilinta, Aggrenox)  -Hold for 7 days (Effient)     For endocrinology insulin patients:    Please contact your endocrinology clinic for insulin adjustment orders prior to your endoscopic procedure.    4. Read all bowel prep instructions carefully    5. AVOID seeds, nuts, popcorn, raw fruits and vegetables (cooked is okay) for 2-3 days before procedure    6.   If you start any NEW medication after your visit today, please notify us. Certain medications will need to be held before the procedure, or the procedure cannot be performed.     >>>Please note: if you were prescribed Suprep for the bowel prep and it is too expensive or not covered by insurance, it is okay to substitute Trilyte (or any similar generic prep). This can be done by notifying the pharmacy or calling our office.

## 2025-02-27 NOTE — TELEPHONE ENCOUNTER
Schedulers- Patient was seen in office today, please call patient to schedule procedure per providers orders below. I reviewed and handed a copy of prep instructions with patient in office as well as medications. Patient is aware of different locations and our providers possibly booking out. No further questions asked.      1. Schedule colonoscopy/egd with MAC w/ General pool MD [Diagnosis: #yulisa  #gen abd pain  #early satiety]     2.  bowel prep from pharmacy (split trilyte)     3.   For cardiology patients and patients on blood thinners:  Please contact your cardiology clinic for clearance to proceed with the endoscopic procedure. If you are on blood thinners, please also confirm with your cardiologic clinic that you are able to hold the blood thinner per our recommendations.\"     BLOOD THINNER ORDERS:  -Hold for 48 hours (Xarelto, Eliquis, Pradaxa, Savaysa)  -Hold for 3 days (Pletal)  -Hold for 5 days (Coumadin, Plavix, Brilinta, Aggrenox)  -Hold for 7 days (Effient)

## 2025-03-03 ENCOUNTER — TELEPHONE (OUTPATIENT)
Age: 40
End: 2025-03-03

## 2025-03-03 NOTE — TELEPHONE ENCOUNTER
Hello - I am reaching out from the [facility] Navigation department, following up on an order from your provider's office to assist in connecting you with resources for care. If you would like to discuss this further, please give us a call at [phone], or for more immediate assistance you can contact our 24-hour help line at [phone]. We look forward to hearing from you soon.

## 2025-03-07 ENCOUNTER — HOSPITAL ENCOUNTER (OUTPATIENT)
Dept: CT IMAGING | Age: 40
Discharge: HOME | End: 2025-03-07
Attending: NURSE PRACTITIONER
Payer: COMMERCIAL

## 2025-03-07 DIAGNOSIS — R68.81 EARLY SATIETY: ICD-10-CM

## 2025-03-07 DIAGNOSIS — R10.84 GENERALIZED ABDOMINAL PAIN: ICD-10-CM

## 2025-03-07 DIAGNOSIS — D50.9 IRON DEFICIENCY ANEMIA, UNSPECIFIED IRON DEFICIENCY ANEMIA TYPE: ICD-10-CM

## 2025-03-07 PROCEDURE — 74177 CT ABD & PELVIS W/CONTRAST: CPT | Performed by: NURSE PRACTITIONER

## 2025-03-12 ENCOUNTER — TELEPHONE (OUTPATIENT)
Facility: CLINIC | Age: 40
End: 2025-03-12

## 2025-03-12 DIAGNOSIS — R93.89 ABNORMAL CT SCAN: ICD-10-CM

## 2025-03-12 DIAGNOSIS — R10.84 GENERALIZED ABDOMINAL PAIN: ICD-10-CM

## 2025-03-12 DIAGNOSIS — R14.0 BLOATING: Primary | ICD-10-CM

## 2025-03-13 NOTE — TELEPHONE ENCOUNTER
I contacted the pt. No answer. I left a detailed message with her results and recommendation for ges. I asked that she call central scheduling to schedule imaging.

## 2025-03-13 NOTE — TELEPHONE ENCOUNTER
Porsche-    CT scan results now available    Please provide result note and I can review this with the patient    Thank you

## 2025-04-21 ENCOUNTER — OFFICE VISIT (OUTPATIENT)
Facility: CLINIC | Age: 40
End: 2025-04-21
Payer: COMMERCIAL

## 2025-04-21 VITALS
SYSTOLIC BLOOD PRESSURE: 126 MMHG | WEIGHT: 186 LBS | OXYGEN SATURATION: 100 % | DIASTOLIC BLOOD PRESSURE: 84 MMHG | HEART RATE: 79 BPM | BODY MASS INDEX: 35.12 KG/M2 | HEIGHT: 61 IN

## 2025-04-21 DIAGNOSIS — R10.84 GENERALIZED ABDOMINAL PAIN: ICD-10-CM

## 2025-04-21 DIAGNOSIS — R68.81 EARLY SATIETY: Primary | ICD-10-CM

## 2025-04-21 DIAGNOSIS — R14.0 ABDOMINAL BLOATING: ICD-10-CM

## 2025-04-21 PROCEDURE — 3079F DIAST BP 80-89 MM HG: CPT | Performed by: FAMILY MEDICINE

## 2025-04-21 PROCEDURE — 99213 OFFICE O/P EST LOW 20 MIN: CPT | Performed by: FAMILY MEDICINE

## 2025-04-21 PROCEDURE — 3008F BODY MASS INDEX DOCD: CPT | Performed by: FAMILY MEDICINE

## 2025-04-21 PROCEDURE — 3074F SYST BP LT 130 MM HG: CPT | Performed by: FAMILY MEDICINE

## 2025-04-21 NOTE — PROGRESS NOTES
CC:    Chief Complaint   Patient presents with    Stomach Pain     Still feeling bloated all the time will be doing Colonoscopy. Following up after her Feb appointment.    Other     Patient states it was recommended to do a [facility] GASTRIC EMPTYING STUDY but she wants your thoughts on this.       HPI: 40 year old female here to follow-up on chronic abdominal pain and bloating.  For the past year she has been feeling tightness in her stomach at night and an ache as well.   This only occurs at night, and it does not seem positional.  The pain will wake her up from sleep as she feels her stomach is very hard.   She stopped drinking energy drinks, and that helped a little bit with the aching in the stomach.  She does feel bloated all the time.   These symptoms get worse after eating, and she feels rachel faster despite not eating a lot.   Feels she is eating less and her appetite has decreased.   She has tried to cut back on spicy foods, but denies ever having any heartburn.   Denies any new foods or dietary changes.  She is not taking anything regularly.  Her menstrual cycles are very irregular, but has not had any more episodes of continuous or heavy bleeding.   She did schedule an EGD and colonoscopy in May as recommended by the APN she saw. Was also recommended to have a gastric emptying study.     ROS:  General:  No fevers, no fatigue, no weight changes, decreased appetite   HEENT:  Denies congestion or nasal discharge, no dysphagia or odynophagia   Cardio:  No chest pain  Pulmonary:  No cough, no SOB  GI:  No N/V/D, abdominal bloating and early satiety, no hematochezia or melena   :  No discharge, no dysuria, no polyuria, no hematuria  Dermatologic:  No rashes    Past Medical History[1]    Social History     Socioeconomic History    Marital status:      Spouse name: Not on file    Number of children: Not on file    Years of education: Not on file    Highest education level: Not on file   Occupational  History    Not on file   Tobacco Use    Smoking status: Never    Smokeless tobacco: Never   Vaping Use    Vaping status: Never Used   Substance and Sexual Activity    Alcohol use: Never    Drug use: Never    Sexual activity: Not on file   Other Topics Concern    Caffeine Concern No    Exercise No    Seat Belt No    Special Diet No    Stress Concern No    Weight Concern No   Social History Narrative    Not on file     Social Drivers of Health     Food Insecurity: Not on file   Transportation Needs: Not on file   Stress: Not on file   Housing Stability: Not on file       Current Medications[2]    Patient has no known allergies.      Vitals:   Vitals:    04/21/25 1108   BP: 126/84   Pulse: 79   SpO2: 100%   Weight: 186 lb (84.4 kg)   Height: 5' 1\" (1.549 m)       Body mass index is 35.14 kg/m².    Physical:  General:  Alert, appropriate, no acute distress   HEENT: supple, no lymphadenopathy   Cardio:  RRR, no murmurs, S1, S2  Pulmonary:  Clear bilaterally, good air entry  GI:  Soft, positive bowel sounds, diffusely tender to palpation, no masses, no guarding, no rebound  Dermatologic:  No rashes or lesions    A total of 10 minutes were spent reviewing previous chart notes, lab, and imaging results.    Assessment and Plan: 40-year-old female here for follow-up of no chronic abdominal pain, bloating, and early satiety.    1. Early satiety    - Agree with gastric emptying study, especially in light of CT findings of delayed gastric emptying with contrast  - Differential includes possibly SIBO, and will consider discussing this with GI pending results    2. Abdominal bloating    - Continue to avoid possible dietary triggers, and will consider pancreatic enzymes to see if this helps with symptoms of pain and bloating after eating pending EGD, colonoscopy, and gastric emptying results    3. Generalized abdominal pain    - Reviewed CT scan without any significant or notable findings, but will continue with testing as ordered  and follow-up pending results       Vivienne Manning DO  04/21/25  11:33 AM         [1]   Past Medical History:   Ectopic pregnancy (HCC)    PONV (postoperative nausea and vomiting)   [2]   Current Outpatient Medications   Medication Sig Dispense Refill    PEG 3350-KCl-Na Bicarb-NaCl (TRILYTE) 420 g Oral Recon Soln Take prep as directed by gastro office. May substitute with Trilyte/generic equivalent if needed. 4000 mL 0

## 2025-04-22 ENCOUNTER — TELEPHONE (OUTPATIENT)
Facility: CLINIC | Age: 40
End: 2025-04-22

## 2025-04-22 NOTE — TELEPHONE ENCOUNTER
1ST Letter sent via my chart          IMAGING     [facility] GASTRIC EMPTYING STUDY  (CPT=78264) (Order #[id number]) on 3/13/25

## 2025-04-29 ENCOUNTER — OFFICE VISIT (OUTPATIENT)
Dept: OBGYN CLINIC | Facility: CLINIC | Age: 40
End: 2025-04-29
Payer: COMMERCIAL

## 2025-04-29 ENCOUNTER — LAB ENCOUNTER (OUTPATIENT)
Dept: LAB | Facility: REFERENCE LAB | Age: 40
End: 2025-04-29
Attending: OBSTETRICS & GYNECOLOGY
Payer: COMMERCIAL

## 2025-04-29 VITALS
SYSTOLIC BLOOD PRESSURE: 126 MMHG | BODY MASS INDEX: 34.43 KG/M2 | DIASTOLIC BLOOD PRESSURE: 74 MMHG | WEIGHT: 182.38 LBS | HEIGHT: 61 IN

## 2025-04-29 DIAGNOSIS — Z12.31 ENCOUNTER FOR SCREENING MAMMOGRAM FOR MALIGNANT NEOPLASM OF BREAST: ICD-10-CM

## 2025-04-29 DIAGNOSIS — N93.9 ABNORMAL UTERINE BLEEDING (AUB): ICD-10-CM

## 2025-04-29 DIAGNOSIS — N93.9 ABNORMAL UTERINE BLEEDING (AUB): Primary | ICD-10-CM

## 2025-04-29 DIAGNOSIS — D25.1 INTRAMURAL LEIOMYOMA OF UTERUS: ICD-10-CM

## 2025-04-29 LAB
ESTRADIOL SERPL-MCNC: 141.2 PG/ML
FSH SERPL-ACNC: 4.4 MIU/ML
LH SERPL-ACNC: 4.2 MIU/ML

## 2025-04-29 PROCEDURE — 36415 COLL VENOUS BLD VENIPUNCTURE: CPT

## 2025-04-29 PROCEDURE — 3074F SYST BP LT 130 MM HG: CPT | Performed by: OBSTETRICS & GYNECOLOGY

## 2025-04-29 PROCEDURE — 83001 ASSAY OF GONADOTROPIN (FSH): CPT

## 2025-04-29 PROCEDURE — 83520 IMMUNOASSAY QUANT NOS NONAB: CPT

## 2025-04-29 PROCEDURE — 3008F BODY MASS INDEX DOCD: CPT | Performed by: OBSTETRICS & GYNECOLOGY

## 2025-04-29 PROCEDURE — 3078F DIAST BP <80 MM HG: CPT | Performed by: OBSTETRICS & GYNECOLOGY

## 2025-04-29 PROCEDURE — 83002 ASSAY OF GONADOTROPIN (LH): CPT

## 2025-04-29 PROCEDURE — 82670 ASSAY OF TOTAL ESTRADIOL: CPT

## 2025-04-29 PROCEDURE — 99204 OFFICE O/P NEW MOD 45 MIN: CPT | Performed by: OBSTETRICS & GYNECOLOGY

## 2025-04-29 NOTE — H&P
[facility]  Obstetrics and Gynecology  History & Physical    CC:   Chief Complaint   Patient presents with    Consult        Subjective:     HPI: Mandi Martinez is a 40 year old  female here for the above CC. Patient reports she has a fibroid. She has stomach issues described as nightly stomach aches. Menses are irregular. 10 days early or late. Lasts about 5-6 days. Denies excessive bleeding. Had one month with intermenstrual spotting. No birth control now. Sexually active men, one partner. Functions appropriately.     Does not feel like stomach problems are worse with menses. Last month the menses was very painful though. Not usually like that.     NO self breast exam concerns. Still feels something there but biopsy was benign.       OB History:  OB History    Para Term  AB Living   2 1 1 0 1 1   SAB IAB Ectopic Multiple Live Births   0 0 1 0 1       Gyne History:  Hx Prior Abnormal Pap: No  Pap Date: 23  Pap Result Notes: WNL  Patient's last menstrual period was 2025 (approximate).  No history of STDs.    Meds:  Medications Ordered Prior to Encounter[1]    All:  Allergies[2]    PMH:  Past Medical History[3]    Immunization History:   Immunization History   Administered Date(s) Administered    Covid-19 Vaccine Pfizer 30 mcg/0.3 ml 2021, 2021    Covid-19 Vaccine Pfizer Yohan-Sucrose 30 mcg/0.3 ml 2022    FLUZONE 6 months and older PFS 0.5 ml (58792) 2015, 10/20/2021, 2022, 10/20/2023    Influenza 10/18/2019    MMR 2004    TDAP 2015       PSH:  Past Surgical History[4]    Social History:  Social History     Socioeconomic History    Marital status:      Spouse name: Not on file    Number of children: Not on file    Years of education: Not on file    Highest education level: Not on file   Occupational History    Not on file   Tobacco Use    Smoking status: Never    Smokeless tobacco: Never   Vaping Use    Vaping  status: Never Used   Substance and Sexual Activity    Alcohol use: Never    Drug use: Never    Sexual activity: Yes     Partners: Male   Other Topics Concern    Caffeine Concern No    Exercise No    Seat Belt No    Special Diet No    Stress Concern No    Weight Concern No     Service Not Asked    Blood Transfusions No    Occupational Exposure Not Asked    Hobby Hazards Not Asked    Sleep Concern Not Asked    Back Care Not Asked    Bike Helmet Not Asked    Self-Exams Not Asked   Social History Narrative    Not on file     Social Drivers of Health     Food Insecurity: Not on file   Transportation Needs: Not on file   Stress: Not on file   Housing Stability: Not on file         Patient feels unsafe or threatened?: No    Family History:  Family History[5]    Health maintenance:  Mammogram: Due 2025 ordered.   Colonoscopy: Going to have coloscopy.     Review of Systems:  General: no complaints  Eyes: no complaints  Respiratory: no complaints  Cardiovascular: no complaints  GI: no complaints  : See HPI  Hematological/lymphatic: no complaints  Breast: no complaints  Psychiatric: no complaints  Endocrine:no complaints  Neurological: no complaints  Immunological: no complaints  Musculoskeletal:no complaints      Objective:     Vitals:    25 0856   BP: 126/74   Weight: 182 lb 6.4 oz (82.7 kg)   Height: 61\"         Body mass index is 34.46 kg/m².    Exam:   GENERAL: well developed, well nourished, in no apparent distress, alert and orientated X 3  PSYCH: mood and affect stable   SKIN: no rashes, no lesions  LUNGS: respiration unlabored  CARDIOVASCULAR: no peripheral edema or varicosities, skin warm and dry  EXTREMITIES:  Normal range of motion, strength 5/5 walking.     Labs:  Reviewed last pap.     Imaging:  Reviewed ultrasound from 2024     Assessment:     Mandi Martinez is a 40 year old  female here for consult on abnormal uterine bleeding, and fibroid uterus.     Problem List[6]      Plan:    1. Abnormal uterine bleeding (AUB)  - Recommend pelvic ultrasound, and labs. Follow up after to discuss.   - Pelvic US Complete GYNE Only [03298/35503]; Future  - Anti-Mullerian Hormone; Future  - FSH; Future  - Estradiol; Future  - LH (Luteinizing Hormone); Future    2. Intramural leiomyoma of uterus  - Pelvic US Complete GYNE Only [28234/33334]; Future    3. Encounter for screening mammogram for malignant neoplasm of breast  - [facility] YONI 2D+3D SCREENING BILAT (CPT=77067/86424); Future     All of the findings and plan were discussed with the patient.  She notes understanding and agrees with the plan of care.  All questions were answered to the best of my ability at this time.      RTC after ultrasound or  sooner if needed     Dr. John Boyd MD    [facility]     This note was created by ShotSpotter voice recognition. Errors in content may be related to improper recognition by the system; efforts to review and correct have been done but errors may still exist. Please be advised the primary purpose of this note is for me to communicate medical care. Standard sentence structure is not always used. Medical terminology and medical abbreviations may be used. There may be grammatical, typographical, and automated fill ins that may have errors missed in proofreading.      Total patient time was 45 minutes in evaluation and management.            [1]   Current Outpatient Medications on File Prior to Visit   Medication Sig Dispense Refill    PEG 3350-KCl-Na Bicarb-NaCl (TRILYTE) 420 g Oral Recon Soln Take prep as directed by gastro office. May substitute with Trilyte/generic equivalent if needed. 4000 mL 0     No current facility-administered medications on file prior to visit.   [2] No Known Allergies  [3]   Past Medical History:   Ectopic pregnancy (HCC)    PONV (postoperative nausea and vomiting)   [4]   Past Surgical History:  Procedure Laterality Date          Laparoscopy,pelvic,biopsy Right 2020     robotic ovarian cystectomy and lyis of adhesions    Needle biopsy left Left 06/08/2023    US bx    Other surgical history  2008    left salpingectomy   [5]   Family History  Problem Relation Age of Onset    Hypertension Mother     Diabetes Mother     Thyroid disease Mother     Hypertension Father     Diabetes Father    [6]   Patient Active Problem List  Diagnosis    Mucinous cystadenoma of ovary, right    Vitamin D deficiency    Iron deficiency anemia    Obesity (BMI 30-39.9)    Fibroids

## 2025-05-02 LAB — MULLERIAN AMH: 0.06 NG/ML

## 2025-05-29 ENCOUNTER — HOSPITAL ENCOUNTER (OUTPATIENT)
Facility: HOSPITAL | Age: 40
Setting detail: HOSPITAL OUTPATIENT SURGERY
Discharge: HOME | End: 2025-05-29
Attending: INTERNAL MEDICINE | Admitting: INTERNAL MEDICINE
Payer: COMMERCIAL

## 2025-05-29 ENCOUNTER — ANESTHESIA EVENT (OUTPATIENT)
Dept: ENDOSCOPY | Facility: HOSPITAL | Age: 40
End: 2025-05-29
Payer: COMMERCIAL

## 2025-05-29 ENCOUNTER — ANESTHESIA (OUTPATIENT)
Dept: ENDOSCOPY | Facility: HOSPITAL | Age: 40
End: 2025-05-29
Payer: COMMERCIAL

## 2025-05-29 ENCOUNTER — TELEPHONE (OUTPATIENT)
Facility: CLINIC | Age: 40
End: 2025-05-29

## 2025-05-29 VITALS
HEART RATE: 80 BPM | BODY MASS INDEX: 34.36 KG/M2 | DIASTOLIC BLOOD PRESSURE: 97 MMHG | RESPIRATION RATE: 13 BRPM | HEIGHT: 61 IN | WEIGHT: 182 LBS | SYSTOLIC BLOOD PRESSURE: 145 MMHG | OXYGEN SATURATION: 100 %

## 2025-05-29 DIAGNOSIS — D50.9 IRON DEFICIENCY ANEMIA, UNSPECIFIED IRON DEFICIENCY ANEMIA TYPE: ICD-10-CM

## 2025-05-29 DIAGNOSIS — R10.84 GENERALIZED ABDOMINAL PAIN: ICD-10-CM

## 2025-05-29 DIAGNOSIS — R68.81 EARLY SATIETY: ICD-10-CM

## 2025-05-29 PROBLEM — K64.8 INTERNAL HEMORRHOIDS: Status: ACTIVE | Noted: 2025-05-29

## 2025-05-29 PROBLEM — K31.89 GASTRIC NODULE: Status: ACTIVE | Noted: 2025-05-29

## 2025-05-29 LAB — B-HCG UR QL: NEGATIVE

## 2025-05-29 PROCEDURE — 45378 DIAGNOSTIC COLONOSCOPY: CPT | Performed by: INTERNAL MEDICINE

## 2025-05-29 PROCEDURE — 43239 EGD BIOPSY SINGLE/MULTIPLE: CPT | Performed by: INTERNAL MEDICINE

## 2025-05-29 RX ORDER — DEXAMETHASONE SODIUM PHOSPHATE 4 MG/ML
VIAL (ML) INJECTION AS NEEDED
Status: DISCONTINUED | OUTPATIENT
Start: 2025-05-29 | End: 2025-05-29 | Stop reason: SURG

## 2025-05-29 RX ORDER — SODIUM CHLORIDE, SODIUM LACTATE, POTASSIUM CHLORIDE, CALCIUM CHLORIDE 600; 310; 30; 20 MG/100ML; MG/100ML; MG/100ML; MG/100ML
INJECTION, SOLUTION INTRAVENOUS CONTINUOUS
Status: DISCONTINUED | OUTPATIENT
Start: 2025-05-29 | End: 2025-05-29

## 2025-05-29 RX ORDER — GLYCOPYRROLATE 0.2 MG/ML
INJECTION, SOLUTION INTRAMUSCULAR; INTRAVENOUS AS NEEDED
Status: DISCONTINUED | OUTPATIENT
Start: 2025-05-29 | End: 2025-05-29 | Stop reason: SURG

## 2025-05-29 RX ORDER — LIDOCAINE HYDROCHLORIDE 10 MG/ML
INJECTION, SOLUTION EPIDURAL; INFILTRATION; INTRACAUDAL; PERINEURAL AS NEEDED
Status: DISCONTINUED | OUTPATIENT
Start: 2025-05-29 | End: 2025-05-29 | Stop reason: SURG

## 2025-05-29 RX ORDER — ONDANSETRON 2 MG/ML
INJECTION INTRAMUSCULAR; INTRAVENOUS AS NEEDED
Status: DISCONTINUED | OUTPATIENT
Start: 2025-05-29 | End: 2025-05-29 | Stop reason: SURG

## 2025-05-29 RX ORDER — NALOXONE HYDROCHLORIDE 0.4 MG/ML
0.08 INJECTION, SOLUTION INTRAMUSCULAR; INTRAVENOUS; SUBCUTANEOUS ONCE AS NEEDED
Status: DISCONTINUED | OUTPATIENT
Start: 2025-05-29 | End: 2025-05-29

## 2025-05-29 RX ORDER — LANSOPRAZOLE 30 MG/1
30 CAPSULE, DELAYED RELEASE ORAL
Qty: 30 CAPSULE | Refills: 0 | Status: SHIPPED | OUTPATIENT
Start: 2025-05-29 | End: 2025-05-30 | Stop reason: CLARIF

## 2025-05-29 RX ADMIN — SODIUM CHLORIDE, SODIUM LACTATE, POTASSIUM CHLORIDE, CALCIUM CHLORIDE: 600; 310; 30; 20 INJECTION, SOLUTION INTRAVENOUS at 08:12:00

## 2025-05-29 RX ADMIN — GLYCOPYRROLATE 0.2 MG: 0.2 INJECTION, SOLUTION INTRAMUSCULAR; INTRAVENOUS at 08:12:00

## 2025-05-29 RX ADMIN — DEXAMETHASONE SODIUM PHOSPHATE 4 MG: 4 MG/ML VIAL (ML) INJECTION at 08:24:00

## 2025-05-29 RX ADMIN — ONDANSETRON 4 MG: 2 INJECTION INTRAMUSCULAR; INTRAVENOUS at 08:24:00

## 2025-05-29 RX ADMIN — SODIUM CHLORIDE, SODIUM LACTATE, POTASSIUM CHLORIDE, CALCIUM CHLORIDE: 600; 310; 30; 20 INJECTION, SOLUTION INTRAVENOUS at 08:42:00

## 2025-05-29 RX ADMIN — LIDOCAINE HYDROCHLORIDE 80 MG: 10 INJECTION, SOLUTION EPIDURAL; INFILTRATION; INTRACAUDAL; PERINEURAL at 08:16:00

## 2025-05-29 NOTE — ANESTHESIA PREPROCEDURE EVALUATION
Anesthesia PreOp Note    HPI:     Mandi Martinez is a 40 year old female who presents for preoperative consultation requested by: Mary Kay Cruz MD    Date of Surgery: 5/29/2025    Procedure(s):  COLONOSCOPY  ESOPHAGOGASTRODUODENOSCOPY (EGD)  Indication: Iron deficiency anemia, unspecified iron deficiency anemia type/Generalized abdominal pain /Early satiety    Relevant Problems   No relevant active problems       NPO:  Last Liquid Consumption Date: 05/29/25  Last Liquid Consumption Time: 0400  Last Solid Consumption Date: 05/28/25  Last Solid Consumption Time: 1000  Last Liquid Consumption Date: 05/29/25          History Review:  Patient Active Problem List    Diagnosis Date Noted    Fibroids 06/17/2024    Obesity (BMI 30-39.9) 03/04/2024    Vitamin D deficiency 12/01/2023    Iron deficiency anemia 12/01/2023    Mucinous cystadenoma of ovary, right 02/22/2020       Past Medical History[1]    Past Surgical History[2]    Prescriptions Prior to Admission[3]  Current Medications and Prescriptions Ordered in Epic[4]    Allergies[5]    Family History[6]  Social Hx on file[7]    Available pre-op labs reviewed.  Lab Results   Component Value Date    URINEPREG Negative 05/29/2025             Vital Signs:  Body mass index is 34.39 kg/m².   height is 1.549 m (5' 1\") and weight is 82.6 kg (182 lb). Her blood pressure is 113/76 and her pulse is 76. Her respiration is 22 and oxygen saturation is 99%.   Vitals:    05/21/25 1543 05/29/25 0735   BP:  113/76   Pulse:  76   Resp:  22   SpO2:  99%   Weight: 82.6 kg (182 lb)    Height: 1.549 m (5' 1\")         Anesthesia Evaluation      History of anesthetic complications   Airway   Mallampati: II  TM distance: >3 FB  Neck ROM: full  Dental - Dentition appears grossly intact     Pulmonary - negative ROS and normal exam   Cardiovascular - negative ROS and normal exam    Neuro/Psych - negative ROS     GI/Hepatic/Renal - negative ROS     Endo/Other - negative ROS    Abdominal                  Anesthesia Plan:   ASA:  2  Plan:   MAC  Plan Comments: Pt has Hx of PONV  Informed Consent Plan and Risks Discussed With:  Patient      I have informed Mandi Martinez and/or legal guardian or family member of the nature of the anesthetic plan, benefits, risks including possible dental damage if relevant, major complications, and any alternative forms of anesthetic management.   All of the patient's questions were answered to the best of my ability. The patient desires the anesthetic management as planned.  Maximino Huang CRNA  2025 7:43 AM  Present on Admission:  **None**           [1]   Past Medical History:   Anesthesia complication    under twilight, o2 sats dropped    Ectopic pregnancy (HCC)    Hx of motion sickness    PONV (postoperative nausea and vomiting)   [2]   Past Surgical History:  Procedure Laterality Date          Laparoscopy,pelvic,biopsy Right 2020    robotic ovarian cystectomy and lyis of adhesions    Needle biopsy left Left 2023    US bx    Other surgical history      left salpingectomy   [3]   Medications Prior to Admission   Medication Sig Dispense Refill Last Dose/Taking    PEG 3350-KCl-Na Bicarb-NaCl (TRILYTE) 420 g Oral Recon Soln Take prep as directed by gastro office. May substitute with Trilyte/generic equivalent if needed. 4000 mL 0    [4]   Current Facility-Administered Medications Ordered in Epic   Medication Dose Route Frequency Provider Last Rate Last Admin    lactated ringers infusion   Intravenous Continuous Mary Kay Cruz MD         No current [facility]-ordered outpatient medications on file.   [5] No Known Allergies  [6]   Family History  Problem Relation Age of Onset    Hypertension Mother     Diabetes Mother     Thyroid disease Mother     Hypertension Father     Diabetes Father    [7]   Social History  Socioeconomic History    Marital status:    Tobacco Use    Smoking status: Never    Smokeless  tobacco: Never   Vaping Use    Vaping status: Never Used   Substance and Sexual Activity    Alcohol use: Never    Drug use: Never    Sexual activity: Yes     Partners: Male   Other Topics Concern    Caffeine Concern No    Exercise No    Seat Belt No    Special Diet No    Stress Concern No    Weight Concern No    Blood Transfusions No

## 2025-05-29 NOTE — OPERATIVE REPORT
ESOPHAGOGASTRODUODENOSCOPY (EGD) & COLONOSCOPY REPORT    Mandi Martinez     1985 Age 40 year old   PCP Vivienne Manning DO Endoscopist Mary Kay Cruz MD       Date of procedure: 25    Procedure: EGD w/ biopsies & Colonoscopy     Pre-operative diagnosis: CAROLINA, abd pain, early satiety     Post-operative diagnosis: hemorrhoids, gastric nodule    Medications: MAC    Withdrawal time: 10 minutes    Complications: none    Procedure: Informed consent was obtained from the patient after the risks of the procedure were discussed, including but not limited to bleeding, perforation, aspiration, infection, or possibility of a missed lesion. We discussed the risks/benefits and alternatives to this procedure, as well as the planned sedation. EGD procedure: The patient was placed in the left lateral decubitus position and begun on continuous blood pressure pulse oximetry and EKG monitoring and this was maintained throughout the procedure. Once an adequate level of sedation was obtained a bite block was placed. Then the lubricated tip of the Wyqxtey-[id number] diagnostic video upper endoscope was inserted and advanced using direct visualization into the posterior pharynx and ultimately into the esophagus with  distal extent of the second portion of the duodenum.     Colonoscopy procedure: Once an adequate level of sedation was obtained a digital rectal exam was completed. Then the lubricated tip of the Wfevjki-BNETR-705 diagnostic video colonoscope was inserted and advanced without difficulty to the cecum using the CO2 insufflation technique. The cecum was identified by localizing the trifold, the appendix and the ileocecal valve. A routine second examination of the cecum/ascending colon was performed. Withdrawal was begun with thorough washing and careful examination of the colonic walls and folds. Photodocumentation was obtained. The bowel prep was excellent. Views of the colon were excellent with washing. I then  carefully withdrew the instrument from the patient who tolerated the procedure well.     Complications: None    EGD findings:      1. Esophagus: The squamocolumnar junction was noted at 36 cm and appeared regular. The diaphragmatic pinch was noted noted at 36 cm from the incisors. The esophageal mucosa appeared normal. There was no evidence of esophagitis, stricture or endoscopic evidence of Desai's esophagus.  2. Stomach: The stomach distended normally. Normal rugal folds were seen. The pylorus was patent. Retroflexion revealed a normal fundus. The gastric mucosa appeared normal. There was a small nodule noted in the antrum, biopsied. Biopsies were taken with a cold forceps from the antrum, incisura and body for histology.   3. Duodenum: The duodenal mucosa appeared normal in the bulb and 2nd portion of the duodenum. Biopsied for celiac disease.     EGD Impression:  -Esophagus: Normal.   -Stomach: Small antral nodule, suspect pancreatic rest, biopsied. Random gastric biopsies obtained.   -Duodenum: Normal, biopsied.     Colonoscopy findings:    1. No polyps noted.  2. Diverticulosis: none.  3. Ileocecal valve appeared normal. The examined portion of the terminal ileum appeared normal.   4. The colonic mucosa throughout the colon showed normal vascular pattern, without evidence of angioectasias or inflammation.   5. A retroflexed view of the rectum revealed small internal hemorrhoids.  6. OMID: normal rectal tone, no masses palpated.     Colonoscopy Impression:  Small internal hemorrhoids.   Normal terminal ileum.   Colon was otherwise normal with glistening mucosa and intact vascular pattern throughout.    No findings on EGD or colonoscopy to explain iron deficiency anemia.     Recommend:  Await pathology.   Repeat colonoscopy in 10 years for screening. If new signs or symptoms develop, colonoscopy may need to be repeated sooner.   High fiber diet.  Monitor for blood in the stool. If having more than just tinge of  blood, call office or go to the ER.  Avoid NSAIDs (motrin, ibuprofen, aleve, advil, naproxen, midol, naprosyn, excedrin) for 14 days.     >>>If tissue was obtained and you have not received your pathology results either by phone or letter within 2 weeks, please call our office at [phone].    Specimens: duodenal biopsies, gastric biopsies, gastric nodule    Blood loss: <1 ml

## 2025-05-29 NOTE — H&P
History & Physical Examination    Patient Name: Mandi Martinez  MRN: [id number]  [facility]: [id number]  YOB: 1985    Diagnosis: Iron deficiency anemia, abd pain, early satiety -- EGD and Colonoscopy today    Prescriptions Prior to Admission[1]  Current Hospital Medications[2]    Allergies: Allergies[3]    Past Medical History[4]  Past Surgical History[5]  Family History[6]  Social History     Tobacco Use    Smoking status: Never    Smokeless tobacco: Never   Substance Use Topics    Alcohol use: Never         SYSTEM Check if Review is Normal Check if Physical Exam is Normal If not normal, please explain:   HEENT [X ] [ X]    NECK  [X ] [ X]    HEART [X ] [ X]    LUNGS [X ] [ X]    ABDOMEN [X ] [ X]    EXTREMITIES [X ] [ X]    OTHER        I have discussed the risks and benefits and alternatives of the procedure with the patient/family.  They understand and agree to proceed with plan of care.   I have reviewed the History and Physical done within the last 30 days.  Any changes noted above.    Mary Kay Cruz MD  [facility]                 [1]   Medications Prior to Admission   Medication Sig Dispense Refill Last Dose/Taking    PEG 3350-KCl-Na Bicarb-NaCl (TRILYTE) 420 g Oral Recon Soln Take prep as directed by gastro office. May substitute with Trilyte/generic equivalent if needed. 4000 mL 0    [2]   Current Facility-Administered Medications   Medication Dose Route Frequency    lactated ringers infusion   Intravenous Continuous   [3] No Known Allergies  [4]   Past Medical History:   Anesthesia complication    under twilight, o2 sats dropped    Ectopic pregnancy (HCC)    Hx of motion sickness    PONV (postoperative nausea and vomiting)   [5]   Past Surgical History:  Procedure Laterality Date          Laparoscopy,pelvic,biopsy Right 2020    robotic ovarian cystectomy and lyis of adhesions    Needle biopsy left Left 2023    US bx    Other surgical history       left salpingectomy   [6]   Family History  Problem Relation Age of Onset    Hypertension Mother     Diabetes Mother     Thyroid disease Mother     Hypertension Father     Diabetes Father

## 2025-05-29 NOTE — TELEPHONE ENCOUNTER
Dr. Hester,   Can you clarify lansoprazole orders for pharmacy? Current order says to take daily and take every 12 hours before meals with amoxicillin x 14 days. No amoxicillin ordered.  Thanks,  Holly

## 2025-05-29 NOTE — ANESTHESIA POSTPROCEDURE EVALUATION
Patient: Mandi Martinez    Procedure Summary       Date: 05/29/25 Room / Location: [facility] ENDOSCOPY 03 / EM ENDOSCOPY    Anesthesia Start: 0812 Anesthesia Stop: 0847    Procedures:       COLONOSCOPY      ESOPHAGOGASTRODUODENOSCOPY (EGD) Diagnosis:       Iron deficiency anemia, unspecified iron deficiency anemia type      Generalized abdominal pain      Early satiety      (hemorrhoids, gastric nodule)    Surgeons: Mary Kay Cruz MD Anesthesiologist: Maximino Huang CRNA    Anesthesia Type: MAC ASA Status: 2            Anesthesia Type: MAC    Vitals Value Taken Time   /83 05/29/25 08:47   Temp - 05/29/25 08:47   Pulse 122 05/29/25 08:47   Resp 18 05/29/25 08:47   SpO2 97 05/29/25 08:47       [facility] AN Post Evaluation:   Patient Evaluated in PACU  Patient Participation: complete - patient participated  Level of Consciousness: awake and awake and alert  Pain Score: 0  Pain Management: satisfactory to patient  Airway Patency:patent  Yes    Cardiovascular Status: acceptable  Respiratory Status: acceptable  Postoperative Hydration acceptable      Maximino Huang CRNA  5/29/2025 8:47 AM

## 2025-05-29 NOTE — DISCHARGE INSTRUCTIONS
Home Care Instructions for Colonoscopy and/or Gastroscopy with Sedation    Diet:  - Resume your regular diet as tolerated unless otherwise instructed.  - Start with light meals to minimize bloating.  - Do not drink alcohol today.    Medication:  - If you have questions about resuming your normal medications, please contact your Primary Care Physician.    Activities:  - Take it easy today. Do not return to work today.  - Do not drive today.  - Do not operate any machinery today (including kitchen equipment).    Colonoscopy:  - You may notice some rectal \"spotting\" (a little blood on the toilet tissue) for a day or two after the exam. This is normal.  - If you experience any rectal bleeding (not spotting), persistent tenderness or sharp severe abdominal pains, oral temperature over 100 degrees Fahrenheit, light-headedness or dizziness, or any other problems, contact your doctor.    Gastroscopy:  - You may have a sore throat for 2-3 days following the exam. This is normal. Gargling with warm salt water (1/2 tsp salt to 1 glass warm water) or using throat lozenges will help.  - If you experience any sharp pain in your neck, abdomen or chest, vomiting of blood, oral temperature over 100 degrees Fahrenheit, light-headedness or dizziness, or any other problems, contact your doctor.    **If unable to reach your doctor, please go to the [facility] Emergency Room**    - Your referring physician will receive a full report of your examination.  - If you do not hear from your doctor's office within two weeks of your biopsy, please call them for your results.    You may be able to see your laboratory results in [employer] between 4 and 7 business days.  In some cases, your physician may not have viewed the results before they are released to [employer].  If you have questions regarding your results contact the physician who ordered the test/exam by phone or via [employer] by choosing \"Ask a Medical Question.\"

## 2025-05-30 ENCOUNTER — TELEPHONE (OUTPATIENT)
Facility: CLINIC | Age: 40
End: 2025-05-30

## 2025-05-30 RX ORDER — LANSOPRAZOLE 30 MG/1
30 CAPSULE, DELAYED RELEASE ORAL
Qty: 30 CAPSULE | Refills: 0 | Status: SHIPPED | OUTPATIENT
Start: 2025-05-30

## 2025-05-30 NOTE — PROGRESS NOTES
TON Morrell. No findings to explain CAROLINA.     GI Staff:    Can you please place a recall to have this patient repeat a colonoscopy in 10 years.    Thank you,  Mary Kay

## 2025-05-30 NOTE — TELEPHONE ENCOUNTER
Recall colonoscopy in 10 years per Dr. Cruz.     Last done 5/29/2025.     Recall entered into patient outreach for  5/29/2035.     Health maintenance updated.

## 2025-05-30 NOTE — TELEPHONE ENCOUNTER
RN called pharmacy to clarify lansoprazole, on hold for extended amount of time.    RN called and left verbal message stating that a new lansoprazole was e-scribed today. GI office number provided.

## 2025-05-30 NOTE — TELEPHONE ENCOUNTER
----- Message from Mary Kay Cruz sent at 5/30/2025 12:54 PM CDT -----  TON Morrell. No findings to explain CAROLINA.     GI Staff:    Can you please place a recall to have this patient repeat a colonoscopy in 10 years.    Thank you,  Mary Kay    ----- Message -----  From: Lab, Background User  Sent: 5/29/2025   4:05 PM CDT  To: Mary Kay Cruz MD

## 2025-06-13 NOTE — TELEPHONE ENCOUNTER
Refill passed per [facility] protocol.      Medication pended for your review / approval    Last office visit 4/21/2025  Last PAP 5/22/2023 and normal   Med is not on current med list     Requested Prescriptions   Pending Prescriptions Disp Refills    Levonorgestrel-Ethinyl Estrad 0.1-20 MG-MCG Oral Chew Tab 28 tablet 0       Gynecology Medication Protocol Failed - 6/13/2025  7:47 AM        Failed - Medication is active on med list        Passed - PASS--PENDING LAST PAP WNL--VIA MANUAL LOOKUP        Passed - Mammogram in past 12 months        Passed - Physical or Pelvic/Breast in past 12 or next 3 mos--VIA MANUAL LOOKUP             Recent Outpatient Visits              1 month ago Abnormal uterine bleeding (AUB)    [facility], [facility] - OB/GYN John Boyd MD    Office Visit    1 month ago Early satiety    [facility], [facility] Vivienne Manning DO    Office Visit    3 months ago Iron deficiency anemia, unspecified iron deficiency anemia type    [facility], [facility] Porsche Lutz APRN    Office Visit    4 months ago Generalized abdominal pain    [facility] Rossy Chowdary MD    Office Visit    6 months ago Bronchospasm    [facility], [facility], [address] [place] [address] Valeria Griffin APRN    Office Visit

## 2025-06-13 NOTE — TELEPHONE ENCOUNTER
Received call from Regine at [facility] pharmacy regarding below prescription  Asking why it is ordered as chewable  Informed that another provider had prescribed the medication and Dr Manning was going to refill it per patient request  Regine will call patient and ask if she does need a chewable form    Levonorgestrel-Ethinyl Estrad 0.1-20 MG-MCG Oral Chew Tab 28 tablet 0 6/13/2025 --   Sig:   Chew 1 tablet by mouth daily.     Route:   Oral     Order #:   [id number]

## 2025-06-17 ENCOUNTER — NURSE TRIAGE (OUTPATIENT)
Dept: INTERNAL MEDICINE CLINIC | Facility: CLINIC | Age: 40
End: 2025-06-17

## 2025-06-17 RX ORDER — LEVONORGESTREL/ETHIN.ESTRADIOL 0.1-0.02MG
1 TABLET ORAL DAILY
Qty: 28 TABLET | Refills: 0 | Status: SHIPPED | OUTPATIENT
Start: 2025-06-17

## 2025-06-17 NOTE — TELEPHONE ENCOUNTER
Dr Manning ==see below, no need to order the tablet form, RN already sent it today .   TON , delmis.       [facility] pharmacy called back regarding the medication clarification from last week.   Informed that it was sent to the provider, and we are just waiting for the clarification.  Per [facility]'s, the patient does not want the chewable form and does not want to pick it up.      RN generated the tablet form, same dose and quantity as previously ordered, and electronically sent it to [facility] today.    RN updated the medication record.    COPIED AND PASTE 6/13/25 refill encounter .   Krys Wade RN Registered Nurse Signed 6/13/2025     Copy     Received call from Regine at [facility] pharmacy regarding below prescription  Asking why it is ordered as chewable  Informed that another provider had prescribed the medication and Dr Manning was going to refill it per patient request  eRgine will call patient and ask if she does need a chewable form     Levonorgestrel-Ethinyl Estrad 0.1-20 MG-MCG Oral Chew Tab 28 tablet 0 6/13/2025 --   Sig:   Chew 1 tablet by mouth daily.       Route:   Oral       Order #:   [id number]

## 2025-08-26 ENCOUNTER — LAB ENCOUNTER (OUTPATIENT)
Dept: LAB | Facility: REFERENCE LAB | Age: 40
End: 2025-08-26
Attending: FAMILY MEDICINE

## 2025-08-26 ENCOUNTER — OFFICE VISIT (OUTPATIENT)
Facility: CLINIC | Age: 40
End: 2025-08-26

## 2025-08-26 VITALS
HEIGHT: 60 IN | DIASTOLIC BLOOD PRESSURE: 80 MMHG | HEART RATE: 77 BPM | OXYGEN SATURATION: 99 % | WEIGHT: 185 LBS | BODY MASS INDEX: 36.32 KG/M2 | SYSTOLIC BLOOD PRESSURE: 132 MMHG

## 2025-08-26 DIAGNOSIS — H93.A9 PULSATILE TINNITUS: ICD-10-CM

## 2025-08-26 DIAGNOSIS — R42 DIZZINESS: ICD-10-CM

## 2025-08-26 DIAGNOSIS — D64.9 ANEMIA, UNSPECIFIED TYPE: ICD-10-CM

## 2025-08-26 DIAGNOSIS — R42 DIZZINESS: Primary | ICD-10-CM

## 2025-08-26 LAB
ANION GAP SERPL CALC-SCNC: 6 MMOL/L (ref 0–18)
BASOPHILS # BLD AUTO: 0.03 X10(3) UL (ref 0–0.2)
BASOPHILS NFR BLD AUTO: 0.4 %
BUN BLD-MCNC: 12 MG/DL (ref 9–23)
BUN/CREAT SERPL: 15.4 (ref 10–20)
CALCIUM BLD-MCNC: 9 MG/DL (ref 8.7–10.4)
CHLORIDE SERPL-SCNC: 105 MMOL/L (ref 98–112)
CO2 SERPL-SCNC: 26 MMOL/L (ref 21–32)
CREAT BLD-MCNC: 0.78 MG/DL (ref 0.55–1.02)
DEPRECATED RDW RBC AUTO: 44.3 FL (ref 35.1–46.3)
EGFRCR SERPLBLD CKD-EPI 2021: 98 ML/MIN/1.73M2 (ref 60–?)
EOSINOPHIL # BLD AUTO: 0.21 X10(3) UL (ref 0–0.7)
EOSINOPHIL NFR BLD AUTO: 2.8 %
ERYTHROCYTE [DISTWIDTH] IN BLOOD BY AUTOMATED COUNT: 15.5 % (ref 11–15)
FASTING STATUS PATIENT QL REPORTED: YES
GLUCOSE BLD-MCNC: 84 MG/DL (ref 70–99)
HCT VFR BLD AUTO: 31.7 % (ref 35–48)
HGB BLD-MCNC: 9.7 G/DL (ref 12–16)
IMM GRANULOCYTES # BLD AUTO: 0.02 X10(3) UL (ref 0–1)
IMM GRANULOCYTES NFR BLD: 0.3 %
LYMPHOCYTES # BLD AUTO: 1.87 X10(3) UL (ref 1–4)
LYMPHOCYTES NFR BLD AUTO: 24.9 %
MCH RBC QN AUTO: 24.2 PG (ref 26–34)
MCHC RBC AUTO-ENTMCNC: 30.6 G/DL (ref 31–37)
MCV RBC AUTO: 79.1 FL (ref 80–100)
MONOCYTES # BLD AUTO: 0.51 X10(3) UL (ref 0.1–1)
MONOCYTES NFR BLD AUTO: 6.8 %
NEUTROPHILS # BLD AUTO: 4.87 X10 (3) UL (ref 1.5–7.7)
NEUTROPHILS # BLD AUTO: 4.87 X10(3) UL (ref 1.5–7.7)
NEUTROPHILS NFR BLD AUTO: 64.8 %
OSMOLALITY SERPL CALC.SUM OF ELEC: 283 MOSM/KG (ref 275–295)
PLATELET # BLD AUTO: 246 10(3)UL (ref 150–450)
POTASSIUM SERPL-SCNC: 3.6 MMOL/L (ref 3.5–5.1)
RBC # BLD AUTO: 4.01 X10(6)UL (ref 3.8–5.3)
SODIUM SERPL-SCNC: 137 MMOL/L (ref 136–145)
TSI SER-ACNC: 0.64 UIU/ML (ref 0.55–4.78)
VIT B12 SERPL-MCNC: 538 PG/ML (ref 211–911)
WBC # BLD AUTO: 7.5 X10(3) UL (ref 4–11)

## 2025-08-26 PROCEDURE — 3075F SYST BP GE 130 - 139MM HG: CPT | Performed by: FAMILY MEDICINE

## 2025-08-26 PROCEDURE — 82607 VITAMIN B-12: CPT

## 2025-08-26 PROCEDURE — 36415 COLL VENOUS BLD VENIPUNCTURE: CPT

## 2025-08-26 PROCEDURE — 3008F BODY MASS INDEX DOCD: CPT | Performed by: FAMILY MEDICINE

## 2025-08-26 PROCEDURE — 84443 ASSAY THYROID STIM HORMONE: CPT

## 2025-08-26 PROCEDURE — 99214 OFFICE O/P EST MOD 30 MIN: CPT | Performed by: FAMILY MEDICINE

## 2025-08-26 PROCEDURE — 85025 COMPLETE CBC W/AUTO DIFF WBC: CPT

## 2025-08-26 PROCEDURE — 80048 BASIC METABOLIC PNL TOTAL CA: CPT

## 2025-08-26 PROCEDURE — 82728 ASSAY OF FERRITIN: CPT

## 2025-08-26 PROCEDURE — 3079F DIAST BP 80-89 MM HG: CPT | Performed by: FAMILY MEDICINE

## 2025-08-27 LAB — DEPRECATED HBV CORE AB SER IA-ACNC: 2 NG/ML (ref 50–306)

## (undated) DEVICE — MANIPULATOR CATH ESCP KRNR

## (undated) DEVICE — V2 SPECIMEN COLLECTION MANIFOLD KIT: Brand: NEPTUNE

## (undated) DEVICE — COLUMN DRAPE

## (undated) DEVICE — STERILE SURGICAL LUBRICANT, METAL TUBE: Brand: SURGILUBE

## (undated) DEVICE — VISUALIZATION SYSTEM: Brand: CLEARIFY

## (undated) DEVICE — CONMED SCOPE SAVER BITE BLOCK, 20X27 MM: Brand: SCOPE SAVER

## (undated) DEVICE — SUCTION CANISTER, 3000CC,SAFELINER: Brand: DEROYAL

## (undated) DEVICE — KIT ENDO ORCAPOD 160/180/190

## (undated) DEVICE — MONOPOLAR CURVED SCISSORS: Brand: ENDOWRIST

## (undated) DEVICE — Device

## (undated) DEVICE — 40580 - THE PINK PAD - ADVANCED TRENDELENBURG POSITIONING KIT: Brand: 40580 - THE PINK PAD - ADVANCED TRENDELENBURG POSITIONING KIT

## (undated) DEVICE — ENCORE® LATEX MICRO SIZE 7.5, STERILE LATEX POWDER-FREE SURGICAL GLOVE: Brand: ENCORE

## (undated) DEVICE — TRAY SKIN PREP PVP-1

## (undated) DEVICE — DISPOSABLE SUCTION/IRRIGATOR TUBE SET: Brand: AHTO

## (undated) DEVICE — YANKAUER,BULB TIP,W/O VENT,RIGID,STERILE: Brand: MEDLINE

## (undated) DEVICE — INSUFFLATION NEEDLE TO ESTABLISH PNEUMOPERITONEUM.: Brand: INSUFFLATION NEEDLE

## (undated) DEVICE — MEDI-VAC NON-CONDUCTIVE SUCTION TUBING: Brand: CARDINAL HEALTH

## (undated) DEVICE — ARM DRAPE

## (undated) DEVICE — [HIGH FLOW INSUFFLATOR,  DO NOT USE IF PACKAGE IS DAMAGED,  KEEP DRY,  KEEP AWAY FROM SUNLIGHT,  PROTECT FROM HEAT AND RADIOACTIVE SOURCES.]: Brand: PNEUMOSURE

## (undated) DEVICE — CANNULA SEAL

## (undated) DEVICE — SOL  .9 3000ML

## (undated) DEVICE — DRAPE SHEET LAVH 124X112X30

## (undated) DEVICE — UNDYED BRAIDED (POLYGLACTIN 910), SYNTHETIC ABSORBABLE SUTURE: Brand: COATED VICRYL

## (undated) DEVICE — GIJAW SINGLE-USE BIOPSY FORCEPS WITH NEEDLE: Brand: GIJAW

## (undated) DEVICE — 60 ML SYRINGE REGULAR TIP: Brand: MONOJECT

## (undated) DEVICE — TIP COVER ACCESSORY

## (undated) DEVICE — FENESTRATED BIPOLAR FORCEPS: Brand: ENDOWRIST

## (undated) DEVICE — ROBOTIC: Brand: MEDLINE INDUSTRIES, INC.

## (undated) DEVICE — MEDI-VAC NON-CONDUCTIVE SUCTION TUBING 6MM X 1.8M (6FT.) L: Brand: CARDINAL HEALTH

## (undated) DEVICE — KIT CLEAN ENDOKIT 1.1OZ GOWNX2

## (undated) DEVICE — TROCAR: Brand: KII FIOS FIRST ENTRY

## (undated) DEVICE — PLUMEPORT ACTIV LAPAROSCOPIC SMOKE FILTRATION DEVICE: Brand: PLUMEPORT ACTIVE

## (undated) DEVICE — DRAPE C-ARM UNIVERSAL

## (undated) DEVICE — BLADELESS OBTURATOR: Brand: WECK VISTA

## (undated) DEVICE — SOL  .9 1000ML BTL

## (undated) NOTE — LETTER
[address] [address], [place]  Authorization for Surgical Operation and Procedure                                                                                           I hereby authorize DR. Amparo Pringle, my physician and his/her assistants (if applicable), which may include medical students, residents, and/or fellows, to perform the following surgical operation/ procedure and administer such anesthesia as may be determined necessary by my physician: ULTRASOUND GUIDED LEFT BREAST BIOPSY WITH CLIP PLACEMENT on Carlyon Kussmaul   2. I recognize that during the surgical operation/procedure, unforeseen conditions may necessitate additional or different procedures than those listed above. I, therefore, further authorize and request that the above-named surgeon, assistants, or designees perform such procedures as are, in their judgment, necessary and desirable. 3.   My surgeon/physician has discussed prior to my surgery the potential benefits, risks and side effects of this procedure; the likelihood of achieving goals; and potential problems that might occur during recuperation. They also discussed reasonable alternatives to the procedure, including risks, benefits, and side effects related to the alternatives and risks related to not receiving this procedure. I have had all my questions answered and I acknowledge that no guarantee has been made as to the result that may be obtained. 4.   Should the need arise during my operation/procedure, which includes change of level of care prior to discharge, I also consent to the administration of blood and/or blood products. Further, I understand that despite careful testing and screening of blood or blood products by collecting agencies, I may still be subject to ill effects as a result of receiving a blood transfusion and/or blood products.   The following are some, but not all, of the potential risks that can occur: fever and allergic reactions, hemolytic reactions, transmission of diseases such as Hepatitis, AIDS and Cytomegalovirus (CMV) and fluid overload. In the event that I wish to have an autologous transfusion of my own blood, or a directed donor transfusion, I will discuss this with my physician. Check only if Refusing Blood or Blood Products  I understand refusal of blood or blood products as deemed necessary by my physician may have serious consequences to my condition to include possible death. I hereby assume responsibility for my refusal and release the hospital, its personnel, and my physicians from any responsibility for the consequences of my refusal.    o  Refuse   5. I authorize the use of any specimen, organs, tissues, body parts or foreign objects that may be removed from my body during the operation/procedure for diagnosis, research or teaching purposes and their subsequent disposal by hospital authorities. I also authorize the release of specimen test results and/or written reports to my treating physician on the hospital medical staff or other referring or consulting physicians involved in my care, at the discretion of the Pathologist or my treating physician. 6.   I consent to the photographing or videotaping of the operations or procedures to be performed, including appropriate portions of my body for medical, scientific, or educational purposes, provided my identity is not revealed by the pictures or by descriptive texts accompanying them. If the procedure has been photographed/videotaped, the surgeon will obtain the original picture, image, videotape or CD. The hospital will not be responsible for storage, release or maintenance of the picture, image, tape or CD.    7.   I consent to the presence of a  or observers in the operating room as deemed necessary by my physician or their designees.     8.   I recognize that in the event my procedure results in extended X-Ray/fluoroscopy time, I may develop a skin reaction. 9. If I have a Do Not Attempt Resuscitation (DNAR) order in place, that status will be suspended while in the operating room, procedural suite, and during the recovery period unless otherwise explicitly stated by me (or a person authorized to consent on my behalf). The surgeon or my attending physician will determine when the applicable recovery period ends for purposes of reinstating the DNAR order. 10. Patients having a sterilization procedure: I understand that if the procedure is successful the results will be permanent and it will therefore be impossible for me to inseminate, conceive, or bear children. I also understand that the procedure is intended to result in sterility, although the result has not been guaranteed. 11. I acknowledge that my physician has explained sedation/analgesia administration to me including the risk and benefits I consent to the administration of sedation/analgesia as may be necessary or desirable in the judgment of my physician. I CERTIFY THAT I HAVE READ AND FULLY UNDERSTAND THE ABOVE CONSENT TO OPERATION and/or OTHER PROCEDURE.     _________________________________________ _________________________________     ___________________________________  Signature of Patient     Signature of Responsible Person                   Printed Name of Responsible Person                              _________________________________________ ______________________________        ___________________________________  Signature of Witness         Date  Time         Relationship to Patient    STATEMENT OF PHYSICIAN My signature below affirms that prior to the time of the procedure; I have explained to the patient and/or his/her legal representative, the risks and benefits involved in the proposed treatment and any reasonable alternative to the proposed treatment.  I have also explained the risks and benefits involved in refusal of the proposed treatment and alternatives to the proposed treatment and have answered the patient's questions.  If I have a significant financial interest in a co-management agreement or a significant financial interest in any product or implant, or other significant relationship used in this procedure/surgery, I have disclosed this and had a discussion with my patient.     _______________________________________________________________ _____________________________  (Signature of Physician)                                                                                         (Date)                                   (Time)  Patient Name: Tatyana Stanton    : 1985   Printed: 2023      Medical Record #: [id number]                                              Page 1 of 1

## (undated) NOTE — LETTER
[address] [place] [address], [place], [place]    Authorization for Surgical Operation and Procedure                               I hereby authorize Mary Kay Cruz MD, my physician and his/her assistants (if applicable), which may include medical students, residents, and/or fellows, to perform the following surgical operation/ procedure and administer such anesthesia as may be determined necessary by my physician: Operation/Procedure name (s) COLONOSCOPY / ESOPHAGOGASTRODUODENOSCOPY on Mandi Martinez   2.   I recognize that during the surgical operation/procedure, unforeseen conditions may necessitate additional or different procedures than those listed above.  I, therefore, further authorize and request that the above-named surgeon, assistants, or designees perform such procedures as are, in their judgment, necessary and desirable.    3.   My surgeon/physician has discussed prior to my surgery the potential benefits, risks and side effects of this procedure; the likelihood of achieving goals; and potential problems that might occur during recuperation.  They also discussed reasonable alternatives to the procedure, including risks, benefits, and side effects related to the alternatives and risks related to not receiving this procedure.  I have had all my questions answered and I acknowledge that no guarantee has been made as to the result that may be obtained.    4.   Should the need arise during my operation/procedure, which includes change of level of care prior to discharge, I also consent to the administration of blood and/or blood products.  Further, I understand that despite careful testing and screening of blood or blood products by collecting agencies, I may still be subject to ill effects as a result of receiving a blood transfusion and/or blood products.  The following are some, but not all, of the potential risks that can occur: fever and allergic reactions, hemolytic  reactions, transmission of diseases such as Hepatitis, AIDS and Cytomegalovirus (CMV) and fluid overload.  In the event that I wish to have an autologous transfusion of my own blood, or a directed donor transfusion, I will discuss this with my physician.  Check only if Refusing Blood or Blood Products  I understand refusal of blood or blood products as deemed necessary by my physician may have serious consequences to my condition to include possible death. I hereby assume responsibility for my refusal and release the hospital, its personnel, and my physicians from any responsibility for the consequences of my refusal.    o  Refuse   5.   I authorize the use of any specimen, organs, tissues, body parts or foreign objects that may be removed from my body during the operation/procedure for diagnosis, research or teaching purposes and their subsequent disposal by hospital authorities.  I also authorize the release of specimen test results and/or written reports to my treating physician on the hospital medical staff or other referring or consulting physicians involved in my care, at the discretion of the Pathologist or my treating physician.    6.   I consent to the photographing or videotaping of the operations or procedures to be performed, including appropriate portions of my body for medical, scientific, or educational purposes, provided my identity is not revealed by the pictures or by descriptive texts accompanying them.  If the procedure has been photographed/videotaped, the surgeon will obtain the original picture, image, videotape or CD.  The hospital will not be responsible for storage, release or maintenance of the picture, image, tape or CD.    7.   I consent to the presence of a  or observers in the operating room as deemed necessary by my physician or their designees.    8.   I recognize that in the event my procedure results in extended X-Ray/fluoroscopy time, I may develop a skin  reaction.    9. If I have a Do Not Attempt Resuscitation (DNAR) order in place, that status will be suspended while in the operating room, procedural suite, and during the recovery period unless otherwise explicitly stated by me (or a person authorized to consent on my behalf). The surgeon or my attending physician will determine when the applicable recovery period ends for purposes of reinstating the DNAR order.  10. Patients having a sterilization procedure: I understand that if the procedure is successful the results will be permanent and it will therefore be impossible for me to inseminate, conceive, or bear children.  I also understand that the procedure is intended to result in sterility, although the result has not been guaranteed.   11. I acknowledge that my physician has explained sedation/analgesia administration to me including the risk and benefits I consent to the administration of sedation/analgesia as may be necessary or desirable in the judgment of my physician.    I CERTIFY THAT I HAVE READ AND FULLY UNDERSTAND THE ABOVE CONSENT TO OPERATION and/or OTHER PROCEDURE.     ____________________________________  _________________________________        ______________________________  Signature of Patient    Signature of Responsible Person                Printed Name of Responsible Person                                      ____________________________________  _____________________________                ________________________________  Signature of Witness        Date  Time         Relationship to Patient    STATEMENT OF PHYSICIAN My signature below affirms that prior to the time of the procedure; I have explained to the patient and/or his/her legal representative, the risks and benefits involved in the proposed treatment and any reasonable alternative to the proposed treatment. I have also explained the risks and benefits involved in refusal of the proposed treatment and alternatives to the proposed  treatment and have answered the patient's questions. If I have a significant financial interest in a co-management agreement or a significant financial interest in any product or implant, or other significant relationship used in this procedure/surgery, I have disclosed this and had a discussion with my patient.     _____________________________________________________              _____________________________  (Signature of Physician)                                                                                         (Date)                                   (Time)  Patient Name: Mandi Martinez      : 1985      Printed: 2025     Medical Record #: [id number]                                      Page 1 of 1

## (undated) NOTE — LETTER
2/4/2020              [address] [id number]         To whom it may concern,    Our patient Yaritza Perez will be having surgery on 2/6/2020 and will need to be off work for 2 weeks after procedure.  Feel free

## (undated) NOTE — LETTER
Date: 9/18/2024    Patient Name: Mandi Martinez          To Whom it may concern:    This letter has been written at the patient's request. The above patient was seen at [facility] for treatment of a medical condition.    This patient should be excused from attending work from days missed.     The patient may return to work on 9/20 if fever free for 24hrs with the following limitations none.        Sincerely,    Olszewski,Kristen J, APRN

## (undated) NOTE — LETTER
2/22/2020          To Whom It May Concern:    Bj Goss is currently under my medical care and may  return to work on Thursday February 27, 2020. Activity is restricted as follows: none.     If you require additional information please contact ou

## (undated) NOTE — LETTER
[address]  [address] [place] [address]  Dept: [phone]  Loc: [phone]    June 8, 2023    Patient: Velma Gonzalez   Date of Visit: 6/8/2023       To Whom It May Concern:    Velma Gonzalez was seen and treated in our department. Velma Gonzalez may return to work on Saturday 10 2023   If you have any questions or concerns, please don't hesitate to call.       Encounter Provider(s):    Dr Angie Elkins      1:37 PM  6/8/2023

## (undated) NOTE — LETTER
Date: 12/7/2023    Patient Name: Mynor Alcantara          To Whom it may concern: This letter has been written at the patient's request. The above patient was seen at the [facility] for treatment of a medical condition. This patient should be excused from attending work/school from 12/7/23 through 12/8/23. The patient may return to work/school on 12/11/23 with the following limitations none. Sincerely,      RENETTA Schultz  [facility]  12/07/23  9:34 AM

## (undated) NOTE — LETTER
4/22/2025          Mandi Martinez    [address] [facility] [address]         Dear Mandi,    Our records indicate that the tests ordered for you by SHEILA Motta  have not been done.  If you have, in fact, already completed the tests or you do not wish to have the tests done, please contact our office at THE NUMBER LISTED BELOW.  Otherwise, please proceed with the testing.  Enclosed is a duplicate order for your convenience.    IMAGING       [facility] GASTRIC EMPTYING STUDY  (CPT=78264) (Order #[id number]) on 3/13/25         Sincerely,    SHEILA Motta  [facility]  [address] [id number] [phone]

## (undated) NOTE — LETTER
[facility]  Administration of Anesthesia  IMandi agree to be cared for by a physician anesthesiologist alone and/or with a nurse anesthetist, who is specially trained to monitor me and give me medicine to put me to sleep or keep me comfortable during my procedure    I understand that my anesthesiologist and/or anesthetist is not an employee or agent of [facility] or [employer]. He or she works for [facility]    As the patient asking for anesthesia services, I agree to:  Allow the anesthesiologist (anesthesia doctor) to give me medicine and do additional procedures as necessary. Some examples are: Starting or using an “IV” to give me medicine, fluids or blood during my procedure, and having a breathing tube placed to help me breathe when I’m asleep (intubation). In the event that my heart stops working properly, I understand that my anesthesiologist will make every effort to sustain my life, unless otherwise directed by [facility] Do Not Resuscitate documents.  Tell my anesthesia doctor before my procedure:  If I am pregnant.  The last time that I ate or drank.  iii. All of the medicines I take (including prescriptions, herbal supplements, and pills I can buy without a prescription (including street drugs/illegal medications). Failure to inform my anesthesiologist about these medicines may increase my risk of anesthetic complications.  iv.If I am allergic to anything or have had a reaction to anesthesia before.  I understand how the anesthesia medicine will help me (benefits).  I understand that with any type of anesthesia medicine there are risks:  The most common risks are: nausea, vomiting, sore throat, muscle soreness, damage to my eyes, mouth, or teeth (from breathing tube placement).  Rare risks include: remembering what happened during my procedure, allergic reactions to medications, injury to my airway, heart, lungs, vision, nerves, or  muscles and in extremely rare instances death.  My doctor has explained to me other choices available to me for my care (alternatives).  Pregnant Patients (“epidural”):  I understand that the risks of having an epidural (medicine given into my back to help control pain during labor), include itching, low blood pressure, difficulty urinating, headache or slowing of the baby’s heart. Very rare risks include infection, bleeding, seizure, irregular heart rhythms and nerve injury.  Regional Anesthesia (“spinal”, “epidural”, & “nerve blocks”):  I understand that rare but potential complications include headache, bleeding, infection, seizure, irregular heart rhythms, and nerve injury.    _____________________________________________________________________________  Patient (or Representative) Signature/Relationship to Patient  Date   Time    _____________________________________________________________________________   Name (if used)    Language/Organization   Time    _____________________________________________________________________________  Nurse Anesthetist Signature     Date   Time  _____________________________________________________________________________  Anesthesiologist Signature     Date   Time  I have discussed the procedure and information above with the patient (or patient’s representative) and answered their questions. The patient or their representative has agreed to have anesthesia services.    _____________________________________________________________________________  Witness        Date   Time  I have verified that the signature is that of the patient or patient’s representative, and that it was signed before the procedure  Patient Name: Mandi Martinez     : 1985                 Printed: 2025 at 5:03 PM    Medical Record #: [id number]                                            Page 1 of 1  ----------ANESTHESIA CONSENT----------